# Patient Record
Sex: FEMALE | Race: WHITE | Employment: FULL TIME | URBAN - METROPOLITAN AREA
[De-identification: names, ages, dates, MRNs, and addresses within clinical notes are randomized per-mention and may not be internally consistent; named-entity substitution may affect disease eponyms.]

---

## 2017-10-02 ENCOUNTER — OFFICE VISIT (OUTPATIENT)
Dept: INTERNAL MEDICINE CLINIC | Age: 57
End: 2017-10-02

## 2017-10-02 VITALS
BODY MASS INDEX: 37.77 KG/M2 | WEIGHT: 235 LBS | HEART RATE: 71 BPM | DIASTOLIC BLOOD PRESSURE: 66 MMHG | SYSTOLIC BLOOD PRESSURE: 100 MMHG | HEIGHT: 66 IN | TEMPERATURE: 98.7 F

## 2017-10-02 DIAGNOSIS — J01.90 ACUTE BACTERIAL SINUSITIS: Primary | ICD-10-CM

## 2017-10-02 DIAGNOSIS — B96.89 ACUTE BACTERIAL SINUSITIS: Primary | ICD-10-CM

## 2017-10-02 PROBLEM — D50.9 IRON DEFICIENCY ANEMIA: Status: ACTIVE | Noted: 2017-10-02

## 2017-10-02 PROBLEM — R53.83 FATIGUE: Status: ACTIVE | Noted: 2017-10-02

## 2017-10-02 PROBLEM — M54.9 BACK PAIN: Status: ACTIVE | Noted: 2017-10-02

## 2017-10-02 PROBLEM — E11.9 DIABETES MELLITUS (HCC): Status: ACTIVE | Noted: 2017-10-02

## 2017-10-02 PROBLEM — M26.629 TMJ ARTHRALGIA: Status: ACTIVE | Noted: 2017-10-02

## 2017-10-02 PROBLEM — M19.90 ARTHRITIS: Status: ACTIVE | Noted: 2017-10-02

## 2017-10-02 PROBLEM — E03.9 HYPOTHYROIDISM: Status: ACTIVE | Noted: 2017-10-02

## 2017-10-02 PROBLEM — F41.9 ANXIETY: Status: ACTIVE | Noted: 2017-10-02

## 2017-10-02 PROBLEM — R07.89 ATYPICAL CHEST PAIN: Status: ACTIVE | Noted: 2017-10-02

## 2017-10-02 PROBLEM — G56.21 ULNAR NEUROPATHY AT ELBOW, RIGHT: Status: ACTIVE | Noted: 2017-10-02

## 2017-10-02 PROBLEM — K58.1 IRRITABLE BOWEL SYNDROME WITH CONSTIPATION: Status: ACTIVE | Noted: 2017-10-02

## 2017-10-02 RX ORDER — FLUCONAZOLE 150 MG/1
150 TABLET ORAL DAILY
Qty: 2 TAB | Refills: 0 | Status: SHIPPED | OUTPATIENT
Start: 2017-10-02 | End: 2017-10-04

## 2017-10-02 RX ORDER — CEFDINIR 300 MG/1
300 CAPSULE ORAL 2 TIMES DAILY
Qty: 20 CAP | Refills: 0 | Status: SHIPPED | OUTPATIENT
Start: 2017-10-02 | End: 2017-10-13 | Stop reason: SDUPTHER

## 2017-10-02 RX ORDER — FLUCONAZOLE 150 MG/1
150 TABLET ORAL DAILY
COMMUNITY
End: 2017-10-02 | Stop reason: ALTCHOICE

## 2017-10-02 RX ORDER — BUSPIRONE HYDROCHLORIDE 5 MG/1
TABLET ORAL
COMMUNITY
End: 2017-10-02

## 2017-10-02 RX ORDER — INSULIN LISPRO 100 [IU]/ML
INJECTION, SOLUTION INTRAVENOUS; SUBCUTANEOUS
COMMUNITY
End: 2021-05-03

## 2017-10-02 RX ORDER — LORAZEPAM 1 MG/1
TABLET ORAL
COMMUNITY

## 2017-10-02 RX ORDER — HYDROCODONE POLISTIREX AND CHLORPHENIRAMINE POLISTIREX 10; 8 MG/5ML; MG/5ML
SUSPENSION, EXTENDED RELEASE ORAL
COMMUNITY
End: 2017-10-02

## 2017-10-02 NOTE — MR AVS SNAPSHOT
Visit Information Date & Time Provider Department Dept. Phone Encounter #  
 10/2/2017 11:10 AM NINI Zafar MD [facility] 84 [phone] [id number] Follow-up Instructions Return if symptoms worsen or fail to improve. Follow-up and Disposition History Upcoming Health Maintenance Date Due Hepatitis C Screening 1960 DTaP/Tdap/Td series (1 - Tdap) 3/19/1983 PAP AKA CERVICAL CYTOLOGY 3/19/1983 BREAST CANCER SCRN MAMMOGRAM 3/19/2012 FOBT Q 1 YEAR AGE 50-75 3/19/2012 INFLUENZA AGE 9 TO ADULT 8/1/2017 Allergies as of 10/2/2017  Review Complete On: 10/2/2017 By: Heena Parnell MD  
  
 Severity Noted Reaction Type Reactions Adhesive  10/10/2013    Rash Augmentin [Amoxicillin-pot Clavulanate]  10/02/2017    Hives Codeine  09/03/2013    Nausea and Vomiting Crestor [Rosuvastatin]  10/02/2017    Hives Levaquin [Levofloxacin]  10/02/2017    Unknown (comments) Morphine  09/03/2013    Hives Oxycodone  12/01/2013    Itching Penicillins  09/03/2013    Hives Sulfa (Sulfonamide Antibiotics)  09/03/2013    Hives Current Immunizations  Reviewed on 12/7/2013 Name Date Influenza Vaccine 10/7/2014 Not reviewed this visit You Were Diagnosed With   
  
 Codes Comments Acute bacterial sinusitis    -  Primary ICD-10-CM: J01.90, B96.89 
ICD-9-CM: 461.9 Vitals BP Pulse Temp Height(growth percentile) Weight(growth percentile) LMP  
 100/66 (BP 1 Location: Left arm, BP Patient Position: Sitting) 71 98.7 °F (37.1 °C) (Oral) 5' 6\" (1.676 m) 235 lb (106.6 kg) 10/10/2010 BMI OB Status Smoking Status 37.93 kg/m2 Postmenopausal Former Smoker Vitals History BMI and BSA Data Body Mass Index Body Surface Area  
 37.93 kg/m 2 2.23 m 2 Preferred Pharmacy Pharmacy Name Phone  [facility] [address] , [address] [place] [address] Alexey Dandy Lito Schreiber [phone] Your Updated Medication List  
  
   
This list is accurate as of: 10/2/17 12:29 PM.  Always use your most recent med list.  
  
  
  
  
 cefdinir 300 mg capsule Commonly known as:  OMNICEF Take 1 Cap by mouth two (2) times a day for 10 days. fluconazole 150 mg tablet Commonly known as:  DIFLUCAN Take 1 Tab by mouth daily for 2 days. FDA advises cautious prescribing of oral fluconazole in pregnancy. HumaLOG 100 unit/mL injection Generic drug:  insulin lispro  
by SubCUTAneous route. 15 - 20 Units before breakfast, lunch and dinner  
  
 levothyroxine 175 mcg tablet Commonly known as:  SYNTHROID Take 175 mcg by mouth Daily (before breakfast). lisinopril 10 mg tablet Commonly known as:  Nadya Dmitriy Take 10 mg by mouth every evening. LORazepam 1 mg tablet Commonly known as:  ATIVAN Take  by mouth every four (4) hours as needed for Anxiety. TRESIBA FLEXTOUCH U-200 SC  
116 Units by SubCUTAneous route daily. TRULICITY 1.5 ZQ/7.9 mL sub-q pen Generic drug:  dulaglutide 1.5 mg by SubCUTAneous route every seven (7) days. Prescriptions Sent to Pharmacy Refills  
 cefdinir (OMNICEF) 300 mg capsule 0 Sig: Take 1 Cap by mouth two (2) times a day for 10 days. Class: Normal  
 Pharmacy: [address] Ph #: [phone] Route: Oral  
 fluconazole (DIFLUCAN) 150 mg tablet 0 Sig: Take 1 Tab by mouth daily for 2 days. FDA advises cautious prescribing of oral fluconazole in pregnancy. Class: Normal  
 Pharmacy: [address], [address] [place] [address] Ph #: [phone] Route: Oral  
  
Follow-up Instructions Return if symptoms worsen or fail to improve. Introducing [facility] & HEALTH SERVICES!    
 [facility] introduces Consumr patient portal. Now you can access parts of your medical record, email your doctor's office, and request medication refills online. 1. In your internet browser, go to [email or web address] 2. Click on the First Time User? Click Here link in the Sign In box. You will see the New Member Sign Up page. 3. Enter your Exitroundt Access Code exactly as it appears below. You will not need to use this code after youve completed the sign-up process. If you do not sign up before the expiration date, you must request a new code. · Exitroundt Access Code: [address] [facility] [address] Expires: 12/31/2017 11:02 AM 
 
4. Enter the last four digits of your Social Security Number (xxxx) and Date of Birth (mm/dd/yyyy) as indicated and click Submit. You will be taken to the next sign-up page. 5. Create a Exitroundt ID. This will be your Spacebar login ID and cannot be changed, so think of one that is secure and easy to remember. 6. Create a Spacebar password. You can change your password at any time. 7. Enter your Password Reset Question and Answer. This can be used at a later time if you forget your password. 8. Enter your e-mail address. You will receive e-mail notification when new information is available in [address] 9. Click Sign Up. You can now view and download portions of your medical record. 10. Click the Download Summary menu link to download a portable copy of your medical information. If you have questions, please visit the Frequently Asked Questions section of the Spacebar website. Remember, Spacebar is NOT to be used for urgent needs. For medical emergencies, dial 911. Now available from your iPhone and Android! Please provide this summary of care documentation to your next provider. Your primary care clinician is listed as NINI Bobby. If you have any questions after today's visit, please call [phone].

## 2017-10-02 NOTE — PROGRESS NOTES
Cali Dumont is a 56 y.o. female presenting for Cold Symptoms (Rm 1 - head congestion X 1 week - ear pain - green drainage)  . 1. Have you been to the ER, urgent care clinic since your last visit? Hospitalized since your last visit? No    2. Have you seen or consulted any other health care providers outside of the [address] [place] [address] since your last visit? Include any pap smears or colon screening.  Yes When: 2 weeks ago Where: Dr Lauren Arenas Reason for visit: endo   [facility] - 8 weeks for sinus infection

## 2017-10-02 NOTE — PROGRESS NOTES
This note will not be viewable in [address] Isla Osgood is a 56 y.o. female and presents with Cold Symptoms (Rm 1 - head congestion X 1 week - ear pain - green drainage - headache - had sinus inf 8 weeks ago at [facility] got Doxycycline did not help / )  . Subjective:  Mrs. Lynn Rajan presents today with complaint of sinus pressure congestion and drainage of yellowish-green color. Symptoms of actually been present for several weeks. [facility] went to [facility] several weeks ago and was placed on a course of doxycycline. She states she felt better for a few days but then this returned. She continues to have sinus pressure congestion and drainage. She has had a slight cough but this is nonproductive. She denies any fever chills or rigors.     Past Medical History:   Diagnosis Date    Anxiety 10/2/2017    Arthritis 10/2/2017    Atypical chest pain 10/2/2017    Back pain 10/2/2017    Diabetes ([facility] [address]     IDDM    Diabetes mellitus ([facility] [address] 10/2/2017    Environmental allergies     COLOGNES, CLEANING PRODUCTS, PETROLEUM - BREATHING PROBLEMS    Fatigue 10/2/2017    GERD (gastroesophageal reflux disease)     Hypothyroidism 10/2/2017    Iron deficiency anemia 10/2/2017    Irritable bowel syndrome with constipation 10/2/2017    Migraine     LAST HEADACHE 10-3-13    Nausea & vomiting     Non-allergic rhinitis     Thyroid disease     HYPOTHYROID    TMJ arthralgia 10/2/2017    Ulnar neuropathy at elbow, right 10/2/2017     Past Surgical History:   Procedure Laterality Date    HX APPENDECTOMY  CHILD    HX  SECTION      HX GYN      TUBAL PREGNANCY    HX GYN      EXC BARTHOLIN CYST    HX HEENT      SINUS SURGERY    HX ORTHOPAEDIC      EXC CYST RIGHT WRIST    HX ORTHOPAEDIC      REPAIR FX RIGHT LOWER LEG - TESSA    HX TONSILLECTOMY  CHILD    HX TUBAL LIGATION       Allergies   Allergen Reactions    Adhesive Rash    Augmentin [Amoxicillin-Pot Clavulanate] Hives    Codeine Nausea and Vomiting    Crestor [Rosuvastatin] Hives    Levaquin [Levofloxacin] Unknown (comments)    Morphine Hives    Oxycodone Itching    Penicillins Hives    Sulfa (Sulfonamide Antibiotics) Hives     Current Outpatient Prescriptions   Medication Sig Dispense Refill    insulin lispro (HUMALOG) 100 unit/mL injection by SubCUTAneous route. 15 - 20 Units before breakfast, lunch and dinner      INSULIN DEGLUDEC (TRESIBA FLEXTOUCH U-200 SC) 116 Units by SubCUTAneous route daily.  LORazepam (ATIVAN) 1 mg tablet Take  by mouth every four (4) hours as needed for Anxiety.  dulaglutide (TRULICITY) 1.5 NJ/6.8 mL sub-q pen 1.5 mg by SubCUTAneous route every seven (7) days.  cefdinir (OMNICEF) 300 mg capsule Take 1 Cap by mouth two (2) times a day for 10 days. 20 Cap 0    fluconazole (DIFLUCAN) 150 mg tablet Take 1 Tab by mouth daily for 2 days. FDA advises cautious prescribing of oral fluconazole in pregnancy. 2 Tab 0    levothyroxine (SYNTHROID) 175 mcg tablet Take 175 mcg by mouth Daily (before breakfast).  lisinopril (PRINIVIL, ZESTRIL) 10 mg tablet Take 10 mg by mouth every evening. Social History     Social History    Marital status:      Spouse name: N/A    Number of children: N/A    Years of education: N/A     Social History Main Topics    Smoking status: Former Smoker     Quit date: 10/10/1990    Smokeless tobacco: Never Used    Alcohol use Yes      Comment: 4 DRINKS PER MONTH    Drug use: No    Sexual activity: Not Asked     Other Topics Concern    None     Social History Narrative     Family History   Problem Relation Age of Onset    Post-op Nausea/Vomiting Mother        Review of Systems  Constitutional: negative for fevers, chills, anorexia and weight loss  Eyes:   negative for visual disturbance and irritation  ENT:   Positive for some sinus congestion and post nasal drainage.    Respiratory:  Positive for cough and chest congestion without wheezing  CV:   negative for chest pain, palpitations, lower extremity edema  GI:   negative for nausea, vomiting, diarrhea, abdominal pain,melena  Integumentary: negative for rash and pruritus  Neurological:  negative for headaches, dizziness, vertigo, memory problems and gait       Objective:  Visit Vitals    /66 (BP 1 Location: Left arm, BP Patient Position: Sitting)    Pulse 71    Temp 98.7 °F (37.1 °C) (Oral)    Ht 5' 6\" (1.676 m)    Wt 235 lb (106.6 kg)    LMP 10/10/2010    BMI 37.93 kg/m2     Physical Exam:   General appearance - alert, ill appearing, and in no distress  Mental status - alert, oriented to person, place, and time  EYE-RADHA, EOMI, conjuctiva clear. No lid swelling or purulent drainage  ENT- TM's clear without A/F level. Pharynx slightly erythematous with drainage noted  Nose -nares are erythematous and boggy  Neck - supple, no significant adenopathy   Chest - Coarse upper airway rhonchi present without wheezing   Heart - normal rate, regular rhythm, normal S1, S2, no murmurs, rubs, clicks or gallops   Skin-No rash appreciated  Neuro -alert, oriented, normal speech, no focal findings. Assessment/Plan:  Diagnoses and all orders for this visit:    1. Acute bacterial sinusitis    Other orders  -     cefdinir (OMNICEF) 300 mg capsule; Take 1 Cap by mouth two (2) times a day for 10 days. -     fluconazole (DIFLUCAN) 150 mg tablet; Take 1 Tab by mouth daily for 2 days. FDA advises cautious prescribing of oral fluconazole in pregnancy. The patient has tolerated Omnicef in the past.  She will be given a 10 day course. She is also given Diflucan for history of vaginal yeast infection secondary to antibiotics. She will follow-up if her symptoms do not improve. Other Instructions:  Mucinex as directed    Increase po fluids    Follow-up Disposition:  Return if symptoms worsen or fail to improve. I have reviewed with the patient details of the assessment and plan and all questions were answered.  Relevent patient education was performed. An After Visit Summary was printed and given to the patient.     Olga Jha MD

## 2017-10-11 ENCOUNTER — TELEPHONE (OUTPATIENT)
Dept: INTERNAL MEDICINE CLINIC | Age: 57
End: 2017-10-11

## 2017-10-13 RX ORDER — CEFDINIR 300 MG/1
300 CAPSULE ORAL 2 TIMES DAILY
Qty: 20 CAP | Refills: 0 | Status: SHIPPED | OUTPATIENT
Start: 2017-10-13 | End: 2019-05-23 | Stop reason: SDUPTHER

## 2018-03-07 ENCOUNTER — OFFICE VISIT (OUTPATIENT)
Dept: INTERNAL MEDICINE CLINIC | Age: 58
End: 2018-03-07

## 2018-03-07 VITALS
HEIGHT: 65 IN | BODY MASS INDEX: 39.49 KG/M2 | SYSTOLIC BLOOD PRESSURE: 115 MMHG | WEIGHT: 237 LBS | HEART RATE: 83 BPM | OXYGEN SATURATION: 97 % | DIASTOLIC BLOOD PRESSURE: 82 MMHG

## 2018-03-07 DIAGNOSIS — R79.89 LOW VITAMIN D LEVEL: ICD-10-CM

## 2018-03-07 DIAGNOSIS — E03.9 ACQUIRED HYPOTHYROIDISM: ICD-10-CM

## 2018-03-07 DIAGNOSIS — R53.83 FATIGUE, UNSPECIFIED TYPE: ICD-10-CM

## 2018-03-07 DIAGNOSIS — D50.9 IRON DEFICIENCY ANEMIA, UNSPECIFIED IRON DEFICIENCY ANEMIA TYPE: ICD-10-CM

## 2018-03-07 DIAGNOSIS — E11.9 TYPE 2 DIABETES MELLITUS WITHOUT COMPLICATION, WITHOUT LONG-TERM CURRENT USE OF INSULIN (HCC): Primary | ICD-10-CM

## 2018-03-07 DIAGNOSIS — F41.9 ANXIETY: ICD-10-CM

## 2018-03-07 LAB
ALBUMIN SERPL-MCNC: 4.7 G/DL (ref 3.9–5.4)
ALKALINE PHOS POC: 72 U/L (ref 38–126)
ALT SERPL-CCNC: 66 U/L (ref 9–52)
AST SERPL-CCNC: 57 U/L (ref 14–36)
BUN BLD-MCNC: 17 MG/DL (ref 7–17)
CALCIUM BLD-MCNC: 10.2 MG/DL (ref 8.4–10.2)
CHLORIDE BLD-SCNC: 102 MMOL/L (ref 98–107)
CO2 POC: 29 MMOL/L (ref 22–32)
CREAT BLD-MCNC: 0.5 MG/DL (ref 0.7–1.2)
EGFR (POC): 109
GLUCOSE POC: 223 MG/DL (ref 65–105)
GRAN# POC: 4.7 K/UL (ref 2–7.8)
GRAN% POC: 59 % (ref 37–92)
HBA1C MFR BLD HPLC: 8.4 % (ref 4.5–5.7)
HCT VFR BLD CALC: 43.8 % (ref 37–51)
HGB BLD-MCNC: 15.2 G/DL (ref 12–18)
IRON POC: 97 UG/DL (ref 37–170)
IRON SATURATION POC: 18 % (ref 15–55)
LY# POC: 2.7 K/UL (ref 0.6–4.1)
LY% POC: 36.1 % (ref 10–58.5)
MCH RBC QN: 32.8 PG (ref 26–32)
MCHC RBC-ENTMCNC: 34.7 G/DL (ref 30–36)
MCV RBC: 94 FL (ref 80–97)
MID #, POC: 0.3 K/UL (ref 0–1.8)
MID% POC: 4.9 % (ref 0.1–24)
PLATELET # BLD: 288 K/UL (ref 140–440)
POTASSIUM SERPL-SCNC: 4.8 MMOL/L (ref 3.6–5)
PROT SERPL-MCNC: 7.5 G/DL (ref 6.3–8.2)
RBC # BLD: 4.64 M/UL (ref 4.2–6.3)
SODIUM SERPL-SCNC: 140 MMOL/L (ref 137–145)
T4 FREE SERPL-MCNC: 1.49 NG/DL (ref 0.71–1.85)
TIBC POC: 535 UG/DL (ref 265–497)
TOTAL BILIRUBIN POC: 0.6 MG/DL (ref 0.2–1.3)
TSH BLD-ACNC: 0.52 UIU/ML (ref 0.4–4.2)
VITAMIN D POC: 51.4 NG/ML (ref 30–96)
WBC # BLD: 7.7 K/UL (ref 4.1–10.9)

## 2018-03-07 RX ORDER — CARISOPRODOL 250 MG/1
250 TABLET ORAL 4 TIMES DAILY
COMMUNITY
End: 2018-12-19

## 2018-03-07 RX ORDER — DICLOFENAC SODIUM 75 MG/1
TABLET, DELAYED RELEASE ORAL
COMMUNITY
End: 2018-12-19

## 2018-03-07 NOTE — MR AVS SNAPSHOT
[address] [address] [phone] Patient: Solo Park MRN: [id number] TPY:[id number] Visit Information Date & Time Provider Department Dept. Phone Encounter #  
 3/7/2018  9:50 AM NINI Traore MD [facility] [phone] [id number] Follow-up Instructions Return for as scheduled. Upcoming Health Maintenance Date Due Hepatitis C Screening 1960 LIPID PANEL Q1 1962 FOOT EXAM Q1 3/19/1972 MICROALBUMIN Q1 3/19/1972 EYE EXAM RETINAL OR DILATED Q1 3/19/1972 Pneumococcal 19-64 Medium Risk (1 of 1 - PPSV23) 3/19/1981 DTaP/Tdap/Td series (1 - Tdap) 3/19/1983 PAP AKA CERVICAL CYTOLOGY 3/19/1983 BREAST CANCER SCRN MAMMOGRAM 3/19/2012 FOBT Q 1 YEAR AGE 50-75 3/19/2012 HEMOGLOBIN A1C Q6M 6/3/2014 Influenza Age 5 to Adult 8/1/2017 Allergies as of 3/7/2018  Review Complete On: 3/7/2018 By: Aminata Franklin MD  
  
 Severity Noted Reaction Type Reactions Adhesive  10/10/2013    Rash Augmentin [Amoxicillin-pot Clavulanate]  10/02/2017    Hives Codeine  09/03/2013    Nausea and Vomiting Crestor [Rosuvastatin]  10/02/2017    Hives Levaquin [Levofloxacin]  10/02/2017    Unknown (comments) Morphine  09/03/2013    Hives Oxycodone  12/01/2013    Itching Penicillins  09/03/2013    Hives Sulfa (Sulfonamide Antibiotics)  09/03/2013    Hives Current Immunizations  Reviewed on 12/7/2013 Name Date Influenza Vaccine 10/7/2014 Not reviewed this visit You Were Diagnosed With   
  
 Codes Comments Type 2 diabetes mellitus without complication, without long-term current use of insulin (HCC)    -  Primary ICD-10-CM: E11.9 ICD-9-CM: 250.00 Acquired hypothyroidism     ICD-10-CM: E03.9 ICD-9-CM: 244.9 Anxiety     ICD-10-CM: F41.9 ICD-9-CM: 300.00  Fatigue, unspecified type     ICD-10-CM: R53.83 
 ICD-9-CM: 780.79 Iron deficiency anemia, unspecified iron deficiency anemia type     ICD-10-CM: D50.9 ICD-9-CM: 280.9 Low vitamin D level     ICD-10-CM: E55.9 ICD-9-CM: 268.9 Vitals BP Pulse Height(growth percentile) Weight(growth percentile) LMP SpO2  
 115/82 (BP 1 Location: Left arm, BP Patient Position: Sitting) 83 5' 5\" (1.651 m) 237 lb (107.5 kg) 10/10/2010 97% BMI OB Status Smoking Status 39.44 kg/m2 Postmenopausal Former Smoker Vitals History BMI and BSA Data Body Mass Index Body Surface Area  
 39.44 kg/m 2 2.22 m 2 Preferred Pharmacy Pharmacy Name Phone [facility] [address] [phone] Your Updated Medication List  
  
   
This list is accurate as of 3/7/18 11:06 AM.  Always use your most recent med list.  
  
  
  
  
 carisoprodol 250 mg tablet Commonly known as:  SOMA Take 250 mg by mouth four (4) times daily. diclofenac EC 75 mg EC tablet Commonly known as:  VOLTAREN Take  by mouth. HumaLOG U-100 Insulin 100 unit/mL injection Generic drug:  insulin lispro  
by SubCUTAneous route. 15 - 20 Units before breakfast, lunch and dinner  
  
 levothyroxine 175 mcg tablet Commonly known as:  SYNTHROID Take 175 mcg by mouth Daily (before breakfast). lisinopril 10 mg tablet Commonly known as:  Madonna Cliche Take 10 mg by mouth every evening. LORazepam 1 mg tablet Commonly known as:  ATIVAN Take  by mouth every four (4) hours as needed for Anxiety. TRESIBA FLEXTOUCH U-200 SC  
116 Units by SubCUTAneous route daily. TRULICITY 1.5 GY/2.7 mL sub-q pen Generic drug:  dulaglutide 1.5 mg by SubCUTAneous route every seven (7) days. We Performed the Following AMB POC COMPLETE CBC,AUTOMATED ENTER E5460342 CPT(R)] AMB POC COMPREHENSIVE METABOLIC PANEL [47526 CPT(R)] AMB POC HEMOGLOBIN A1C [65125 CPT(R)] AMB POC IRON BINDING CAPACITY (FE/TIBC) [[id number] Custom] AMB POC T4, FREE [id number] CPT(R)] AMB POC TSH [34831 CPT(R)] AMB POC VITAMIN D [33876 CPT(R)] Follow-up Instructions Return for as scheduled. Introducing hospitals & HEALTH SERVICES! Niru Whipple introduces [employer]an Fight patient portal. Now you can access parts of your medical record, email your doctor's office, and request medication refills online. 1. In your internet browser, go to [email or web address] 2. Click on the First Time User? Click Here link in the Sign In box. You will see the New Member Sign Up page. 3. Enter your [employer] Fight Access Code exactly as it appears below. You will not need to use this code after youve completed the sign-up process. If you do not sign up before the expiration date, you must request a new code. · [employer] Fight Access Code: [email or web address] Expires: 6/5/2018  9:50 AM 
 
4. Enter the last four digits of your Social Security Number (xxxx) and Date of Birth (mm/dd/yyyy) as indicated and click Submit. You will be taken to the next sign-up page. 5. Create a Clan Fight ID. This will be your Clan Fight login ID and cannot be changed, so think of one that is secure and easy to remember. 6. Create a Clan Fight password. You can change your password at any time. 7. Enter your Password Reset Question and Answer. This can be used at a later time if you forget your password. 8. Enter your e-mail address. You will receive e-mail notification when new information is available in [address] 9. Click Sign Up. You can now view and download portions of your medical record. 10. Click the Download Summary menu link to download a portable copy of your medical information. If you have questions, please visit the Frequently Asked Questions section of the Clan Fight website. Remember, [employer] Fight is NOT to be used for urgent needs. For medical emergencies, dial 911. Now available from your iPhone and Android! Please provide this summary of care documentation to your next provider. Your primary care clinician is listed as NINI John. If you have any questions after today's visit, please call [phone].

## 2018-03-07 NOTE — PROGRESS NOTES
Keisha [place] presents with   Chief Complaint   Patient presents with    Abdominal Pain    Headache    Hair/Scalp Problem     hair loss    Nail Problem     Fingernail & toenail dry    Eye Problem     bilateral dryness     Patient here with complaint of upper abdominal pain, headache, hair loss, fingernail & toenail brittle & eyes dry. 1. Have you been to the ER, urgent care clinic since your last visit? Hospitalized since your last visit? Yes When: 12/2017 Where: [facility] Reason for visit: Flu--positive for A & B    2. Have you seen or consulted any other health care providers outside of the [address] [place] [address] since your last visit? Include any pap smears or colon screening.  Yes When: 01/2017 Where: [facility]Dr Miguelito Fontana Reason for visit: back pain

## 2018-03-07 NOTE — PROGRESS NOTES
This note will not be viewable in [address] Maria L Riley is a 56 y.o. female and presents with Abdominal Pain; Headache; Hair/Scalp Problem (hair loss); Nail Problem (Fingernail & toenail dry); and Eye Problem (bilateral dryness)  . Subjective:  Mrs. Sushma Wright presents today with multiple complaints. Primarily she is concerned because she has had significant hair loss over the past couple of months. She notes brittleness of her nails. She has been taking an over-the-counter supplement with biotin which does not seem to have helped. She saw her endocrinologist and had her thyroid testing done which was in normal range. She has had recurring headaches which appear to be triggered by the change in the weather. She has been under a fair amount of stress over the past few months and after initially losing a significant amount of weight has regained that weight and is very frustrated by this. She also has abdominal distention and mild discomfort in the left upper quadrant is been present for the past few weeks. It comes and goes. It is not associated with eating. There are no changes in her bowel habits. Pain seems to be exacerbated when she sits up from a lying position.     Past Medical History:   Diagnosis Date    Anxiety 10/2/2017    Arthritis 10/2/2017    Atypical chest pain 10/2/2017    Back pain 10/2/2017    Diabetes ([address]     IDDM    Diabetes mellitus ([address] 10/2/2017    Environmental allergies     COLOGNES, CLEANING PRODUCTS, PETROLEUM - BREATHING PROBLEMS    Fatigue 10/2/2017    GERD (gastroesophageal reflux disease)     Hypothyroidism 10/2/2017    Iron deficiency anemia 10/2/2017    Irritable bowel syndrome with constipation 10/2/2017    Migraine     LAST HEADACHE 10-3-13    Nausea & vomiting     Non-allergic rhinitis     Thyroid disease     HYPOTHYROID    TMJ arthralgia 10/2/2017    Ulnar neuropathy at elbow, right 10/2/2017     Past Surgical History:   Procedure Laterality Date  HX APPENDECTOMY  CHILD    HX  SECTION      HX GYN      TUBAL PREGNANCY    HX GYN      EXC BARTHOLIN CYST    HX HEENT      SINUS SURGERY    HX ORTHOPAEDIC      EXC CYST RIGHT WRIST    HX ORTHOPAEDIC      REPAIR FX RIGHT LOWER LEG - TESSA    HX TONSILLECTOMY  CHILD    HX TUBAL LIGATION       Allergies   Allergen Reactions    Adhesive Rash    Augmentin [Amoxicillin-Pot Clavulanate] Hives    Codeine Nausea and Vomiting    Crestor [Rosuvastatin] Hives    Levaquin [Levofloxacin] Unknown (comments)    Morphine Hives    Oxycodone Itching    Penicillins Hives    Sulfa (Sulfonamide Antibiotics) Hives     Current Outpatient Prescriptions   Medication Sig Dispense Refill    diclofenac EC (VOLTAREN) 75 mg EC tablet Take  by mouth.  carisoprodol (SOMA) 250 mg tablet Take 250 mg by mouth four (4) times daily.  insulin lispro (HUMALOG) 100 unit/mL injection by SubCUTAneous route. 15 - 20 Units before breakfast, lunch and dinner      INSULIN DEGLUDEC (TRESIBA FLEXTOUCH U-200 SC) 116 Units by SubCUTAneous route daily.  dulaglutide (TRULICITY) 1.5 BB/7.3 mL sub-q pen 1.5 mg by SubCUTAneous route every seven (7) days.  levothyroxine (SYNTHROID) 175 mcg tablet Take 175 mcg by mouth Daily (before breakfast).  lisinopril (PRINIVIL, ZESTRIL) 10 mg tablet Take 10 mg by mouth every evening.  LORazepam (ATIVAN) 1 mg tablet Take  by mouth every four (4) hours as needed for Anxiety.        Social History     Social History    Marital status:      Spouse name: N/A    Number of children: N/A    Years of education: N/A     Social History Main Topics    Smoking status: Former Smoker     Quit date: 10/10/1990    Smokeless tobacco: Never Used    Alcohol use Yes      Comment: 4 DRINKS PER MONTH    Drug use: No    Sexual activity: Not Asked     Other Topics Concern    None     Social History Narrative     Family History   Problem Relation Age of Onset    Post-op Nausea/Vomiting Mother        Review of Systems  Constitutional:  negative for fevers, chills, anorexia and weight loss  Eyes:    negative for visual disturbance and irritation  ENT:    negative for tinnitus,sore throat,nasal congestion,ear pains. hoarseness  Respiratory:     negative for cough, hemoptysis, dyspnea,wheezing  CV:    negative for chest pain, palpitations, lower extremity edema  GI:    negative for nausea, vomiting, diarrhea, abdominal pain,melena  Endo:               negative for polyuria,polydipsia,polyphagia,heat intolerance  Genitourinary : negative for frequency, dysuria and hematuria  Integumentary: negative for rash and pruritus  Hematologic:   negative for easy bruising and gum/nose bleeding  Musculoskel:  negative for myalgias, arthralgias, back pain, muscle weakness, joint pain  Neurological:   negative for headaches, dizziness, vertigo, memory problems and gait   Behavl/Psych:  negative for feelings of depression, mood changes  ROS otherwise negative      Objective:  Visit Vitals    /82 (BP 1 Location: Left arm, BP Patient Position: Sitting)    Pulse 83    Ht 5' 5\" (1.651 m)    Wt 237 lb (107.5 kg)    LMP 10/10/2010    SpO2 97%    BMI 39.44 kg/m2     Physical Exam:   General appearance - alert, well appearing, and in no distress  Mental status - alert, oriented to person, place, and time  EYE-RADHA, EOMI, fundi normal, corneas normal, no foreign bodies  ENT-ENT exam normal, no neck nodes or sinus tenderness  Nose - normal and patent, no erythema, discharge or polyps  Mouth - mucous membranes moist, pharynx normal without lesions  Neck - supple, no significant adenopathy   Chest - clear to auscultation, no wheezes, rales or rhonchi, symmetric air entry   Heart - normal rate, regular rhythm, normal S1, S2, no murmurs, rubs, clicks or gallops   Abdomen - soft, minimally tender left upper abdominal wall with no palpable hernia, nondistended, no masses or organomegaly  Lymph- no adenopathy palpable  Ext-peripheral pulses normal, no pedal edema, no clubbing or cyanosis  Skin-Warm and dry. no hyperpigmentation, vitiligo, or suspicious lesions  Neuro -alert, oriented, normal speech, no focal findings or movement disorder noted      Assessment/Plan:  Diagnoses and all orders for this visit:    1. Type 2 diabetes mellitus without complication, without long-term current use of insulin (HCC)  -     AMB POC HEMOGLOBIN A1C  -     AMB POC COMPREHENSIVE METABOLIC PANEL    2. Acquired hypothyroidism  -     AMB POC T4, FREE  -     AMB POC TSH    3. Anxiety    4. Fatigue, unspecified type  -     AMB POC COMPLETE CBC,AUTOMATED ENTER  -     AMB POC COMPREHENSIVE METABOLIC PANEL    5. Iron deficiency anemia, unspecified iron deficiency anemia type  -     AMB POC COMPLETE CBC,AUTOMATED ENTER  -     AMB POC IRON BINDING CAPACITY (FE/TIBC)    6. Low vitamin D level  -     AMB POC VITAMIN D          ICD-10-CM ICD-9-CM    1. Type 2 diabetes mellitus without complication, without long-term current use of insulin (HCC) E11.9 250.00 AMB POC HEMOGLOBIN A1C      AMB POC COMPREHENSIVE METABOLIC PANEL   2. Acquired hypothyroidism E03.9 244.9 AMB POC T4, FREE      AMB POC TSH   3. Anxiety F41.9 300.00    4. Fatigue, unspecified type R53.83 780.79 AMB POC COMPLETE CBC,AUTOMATED ENTER      AMB POC COMPREHENSIVE METABOLIC PANEL   5. Iron deficiency anemia, unspecified iron deficiency anemia type D50.9 280.9 AMB POC COMPLETE CBC,AUTOMATED ENTER      AMB POC IRON BINDING CAPACITY (FE/TIBC)   6. Low vitamin D level E55.9 268.9 AMB POC VITAMIN D     Plan:    Patient has a significant number of symptoms all of which could be related to anxiety. However we will do follow-up thyroid testing as well as metabolic panel iron and vitamin D as well to make sure there is no metabolic abnormality that is contributing to her symptoms.   We will continue her current medical regimen and consider addition of medicine for anxiety if her symptoms should persist.    Follow-up Disposition:  Return for as scheduled. I have reviewed with the patient details of the assessment and plan and all questions were answered. Relevent patient education was performed. Verbal and/or written instructions (see AVS) provided. The most recent lab findings were reviewed with the patient. Plan was discussed with patient who verbally expressed understanding. An After Visit Summary was printed and given to the patient.     Izabela Estrada MD

## 2018-09-18 ENCOUNTER — DOCUMENTATION ONLY (OUTPATIENT)
Dept: INTERNAL MEDICINE CLINIC | Age: 58
End: 2018-09-18

## 2018-09-18 NOTE — PROGRESS NOTES
PT CALLED REQUESTING RX FOR SEDATIVE PRIOR TO HER MRI TOMORROW MORNING. DR. Ileana Beckman IS THE ORDERING PROVIDER FOR THE TEST. OK PER DR. ELIZABETH TO CALL IN ATIVAN 1MG/0 REFILL, TAKE ONE TABLET 1 HOUR PRIOR TO PROCEDURE. INSTRUCTED PT SHE MUST HAVE A  TAKE HER TO APPT. PT EXPRESSES UNDERSTANDING.

## 2018-09-25 ENCOUNTER — HOSPITAL ENCOUNTER (OUTPATIENT)
Dept: MRI IMAGING | Age: 58
Discharge: HOME | End: 2018-09-25
Attending: ORTHOPAEDIC SURGERY
Payer: COMMERCIAL

## 2018-09-25 DIAGNOSIS — M54.50 CHRONIC LOW BACK PAIN: ICD-10-CM

## 2018-09-25 DIAGNOSIS — M51.36 DEGENERATION OF INTERVERTEBRAL DISC OF LUMBAR REGION: ICD-10-CM

## 2018-09-25 DIAGNOSIS — G89.29 CHRONIC LOW BACK PAIN: ICD-10-CM

## 2018-09-25 PROCEDURE — 72148 MRI LUMBAR SPINE W/O DYE: CPT

## 2018-12-17 ENCOUNTER — OFFICE VISIT (OUTPATIENT)
Dept: INTERNAL MEDICINE CLINIC | Age: 58
End: 2018-12-17

## 2018-12-17 VITALS
HEART RATE: 70 BPM | BODY MASS INDEX: 39.65 KG/M2 | WEIGHT: 238 LBS | HEIGHT: 65 IN | RESPIRATION RATE: 16 BRPM | DIASTOLIC BLOOD PRESSURE: 66 MMHG | SYSTOLIC BLOOD PRESSURE: 100 MMHG | OXYGEN SATURATION: 95 %

## 2018-12-17 DIAGNOSIS — E03.9 ACQUIRED HYPOTHYROIDISM: ICD-10-CM

## 2018-12-17 DIAGNOSIS — Z01.818 PREOP EXAMINATION: Primary | ICD-10-CM

## 2018-12-17 DIAGNOSIS — M54.41 RIGHT-SIDED LOW BACK PAIN WITH RIGHT-SIDED SCIATICA, UNSPECIFIED CHRONICITY: ICD-10-CM

## 2018-12-17 DIAGNOSIS — E11.9 TYPE 2 DIABETES MELLITUS WITHOUT COMPLICATION, WITHOUT LONG-TERM CURRENT USE OF INSULIN (HCC): ICD-10-CM

## 2018-12-17 PROBLEM — E66.01 SEVERE OBESITY (HCC): Status: ACTIVE | Noted: 2018-12-17

## 2018-12-17 LAB
BACTERIA UA POCT, BACTPOCT: NORMAL
BILIRUB UR QL STRIP: NEGATIVE
CASTS UA POCT: NEGATIVE
CLUE CELLS, CLUEPOCT: NEGATIVE
CRYSTALS UA POCT, CRYSPOCT: NEGATIVE
EPITHELIAL CELLS POCT: NORMAL
GLUCOSE UR-MCNC: NEGATIVE MG/DL
GRAN# POC: 4.4 K/UL (ref 2–7.8)
GRAN% POC: 59.2 % (ref 37–92)
HCT VFR BLD CALC: 40.3 % (ref 37–51)
HGB BLD-MCNC: 13.7 G/DL (ref 12–18)
KETONES P FAST UR STRIP-MCNC: NEGATIVE MG/DL
LY# POC: 2.5 K/UL (ref 0.6–4.1)
LY% POC: 36.1 % (ref 10–58.5)
MCH RBC QN: 32.3 PG (ref 26–32)
MCHC RBC-ENTMCNC: 34.1 G/DL (ref 30–36)
MCV RBC: 95 FL (ref 80–97)
MID #, POC: 0.3 K/UL (ref 0–1.8)
MID% POC: 4.7 % (ref 0.1–24)
MUCUS UA POCT, MUCPOCT: NORMAL
PH UR STRIP: 7 [PH] (ref 5–7)
PLATELET # BLD: 256 K/UL (ref 140–440)
PROT UR QL STRIP: NEGATIVE
RBC # BLD: 4.26 M/UL (ref 4.2–6.3)
RBC UA POCT, RBCPOCT: NORMAL
SP GR UR STRIP: 1.01 (ref 1.01–1.02)
TRICH UA POCT, TRICHPOC: NEGATIVE
UA UROBILINOGEN AMB POC: NORMAL (ref 0.2–1)
URINALYSIS CLARITY POC: CLEAR
URINALYSIS COLOR POC: NORMAL
URINE BLOOD POC: NORMAL
URINE CULT COMMENT, POCT: NORMAL
URINE LEUKOCYTES POC: NORMAL
URINE NITRITES POC: NEGATIVE
WBC # BLD: 7.2 K/UL (ref 4.1–10.9)
WBC UA POCT, WBCPOCT: NORMAL
YEAST UA POCT, YEASTPOC: NEGATIVE

## 2018-12-17 RX ORDER — AZITHROMYCIN 250 MG/1
TABLET, FILM COATED ORAL
Qty: 6 TAB | Refills: 0 | Status: SHIPPED | OUTPATIENT
Start: 2018-12-17 | End: 2018-12-19

## 2018-12-17 NOTE — PROGRESS NOTES
This note will not be viewable in [address] Adriana Loza is a 66 y.o. female and presents with Pre-op Exam (back surgery on 1/09/19)  . Subjective:    Mrs. Hafsa Levine presents today for preoperative evaluation prior to undergoing fusion of the lumbar spine by Dr. Nitish Siegel on January 9, 2019. She is been having low back pain this been persistent on and off for the past few years and more recently had done physical therapy but is having worsening symptoms involving her lower spine with associated right lower extremity radiculopathy. She currently denies chest pain, palpitations, PND, orthopnea, or pedal edema. Risk factors include diabetes. Review of Systems  Constitutional:   Eyes:   negative for visual disturbance and irritation  ENT:   negative for tinnitus,sore throat,nasal congestion,ear pains. hoarseness  Respiratory:  negative for cough, hemoptysis, dyspnea,wheezing  CV:   negative for chest pain, palpitations, lower extremity edema  GI:   negative for nausea, vomiting, diarrhea, abdominal pain,melena  Endo:               negative for polyuria,polydipsia,polyphagia,heat intolerance  Genitourinary: negative for frequency, dysuria and hematuria  Integumentary: negative for rash and pruritus  Hematologic:  negative for easy bruising and gum/nose bleeding  Musculoskel: negative for myalgias, arthralgias,muscle weakness, joint pain  Neurological:  negative for headaches, dizziness, vertigo, memory problems and gait   Behavl/Psych: negative for feelings of anxiety, depression, mood changes    Past Medical History:   Diagnosis Date    Anxiety 10/2/2017    Arthritis 10/2/2017    Atypical chest pain 10/2/2017    Back pain 10/2/2017    Diabetes [address] [address] [address]     IDDM    Diabetes mellitus ([facility] [address] 10/2/2017    Environmental allergies     COLOGNES, CLEANING PRODUCTS, PETROLEUM - BREATHING PROBLEMS    Fatigue 10/2/2017    GERD (gastroesophageal reflux disease)     Hypothyroidism 10/2/2017    Iron deficiency anemia 10/2/2017    Irritable bowel syndrome with constipation 10/2/2017    Migraine     LAST HEADACHE 10-3-13    Nausea & vomiting     Non-allergic rhinitis     Thyroid disease     HYPOTHYROID    TMJ arthralgia 10/2/2017    Ulnar neuropathy at elbow, right 10/2/2017     Past Surgical History:   Procedure Laterality Date    HX APPENDECTOMY  CHILD    HX  SECTION      HX GYN      TUBAL PREGNANCY    HX GYN      EXC BARTHOLIN CYST    HX HEENT      SINUS SURGERY    HX ORTHOPAEDIC      EXC CYST RIGHT WRIST    HX ORTHOPAEDIC      REPAIR FX RIGHT LOWER LEG - TESSA    HX TONSILLECTOMY  CHILD    HX TUBAL LIGATION       Social History     Socioeconomic History    Marital status:      Spouse name: Not on file    Number of children: Not on file    Years of education: Not on file    Highest education level: Not on file   Tobacco Use    Smoking status: Former Smoker     Last attempt to quit: 10/10/1990     Years since quittin.2    Smokeless tobacco: Never Used   Substance and Sexual Activity    Alcohol use: Yes     Comment: 4 DRINKS PER MONTH    Drug use: No     Family History   Problem Relation Age of Onset    Post-op Nausea/Vomiting Mother      Current Outpatient Medications   Medication Sig Dispense Refill    insulin lispro (HUMALOG) 100 unit/mL injection by SubCUTAneous route. 15 - 20 Units before breakfast, lunch and dinner      INSULIN DEGLUDEC (TRESIBA FLEXTOUCH U-200 SC) 116 Units by SubCUTAneous route daily.  LORazepam (ATIVAN) 1 mg tablet Take  by mouth every four (4) hours as needed for Anxiety.  dulaglutide (TRULICITY) 1.5 PW/4.4 mL sub-q pen 1.5 mg by SubCUTAneous route every seven (7) days.  levothyroxine (SYNTHROID) 175 mcg tablet Take 175 mcg by mouth Daily (before breakfast).  lisinopril (PRINIVIL, ZESTRIL) 10 mg tablet Take 10 mg by mouth every evening.  diclofenac EC (VOLTAREN) 75 mg EC tablet Take  by mouth.       carisoprodol (SOMA) 250 mg tablet Take 250 mg by mouth four (4) times daily. Allergies   Allergen Reactions    Adhesive Rash    Augmentin [Amoxicillin-Pot Clavulanate] Hives    Codeine Nausea and Vomiting    Crestor [Rosuvastatin] Hives    Levaquin [Levofloxacin] Unknown (comments)    Morphine Hives    Oxycodone Itching    Penicillins Hives    Sulfa (Sulfonamide Antibiotics) Hives       Objective:  Visit Vitals  /66 (BP 1 Location: Left arm, BP Patient Position: Sitting)   Pulse 70   Resp 16   Ht 5' 5\" (1.651 m)   Wt 238 lb (108 kg)   LMP 10/10/2010   SpO2 95%   BMI 39.61 kg/m²     Physical Exam:   General appearance - alert, well appearing, and in no distress  Mental status - alert, oriented to person, place, and time  EYE-RADHA, EOMI, fundi normal, corneas normal, no foreign bodies  ENT-ENT exam normal, no neck nodes or sinus tenderness  Nose - normal and patent, no erythema, discharge or polyps  Mouth - mucous membranes moist, pharynx normal without lesions  Neck - supple, no significant adenopathy   Chest - clear to auscultation, no wheezes, rales or rhonchi, symmetric air entry   Heart - normal rate, regular rhythm, normal S1, S2, no murmurs, rubs, clicks or gallops   Abdomen - soft, nontender, nondistended, no masses or organomegaly  Lymph- no adenopathy palpable  Ext-peripheral pulses normal, no pedal edema, no clubbing or cyanosis  Skin-Warm and dry. no hyperpigmentation, vitiligo, or suspicious lesions  Neuro -alert, oriented, normal speech, no focal findings or movement disorder noted  Musculoskeletal- FROM, no bony abnormalities, no point tenderness    No results found for this visit on 12/17/18. All results for lab orders may not have been returned by the time this encountered was closed. Assessment/Plan:       ICD-10-CM ICD-9-CM    1.  Preop examination Z01.818 V72.84 HEMOGLOBIN A1C WITH EAG      AMB POC COMPLETE CBC,AUTOMATED ENTER      METABOLIC PANEL, BASIC      PROTHROMBIN TIME + INR      AMB POC URINALYSIS DIP STICK AUTO W/ MICRO       AMB POC EKG ROUTINE W/ 12 LEADS, INTER & REP   2. Acquired hypothyroidism E03.9 244.9    3. Type 2 diabetes mellitus without complication, without long-term current use of insulin (HCC) E11.9 250.00    4. Right-sided low back pain with right-sided sciatica, unspecified chronicity M54.41 724.3        Orders Placed This Encounter    HEMOGLOBIN A1C WITH EAG    METABOLIC PANEL, BASIC    PROTHROMBIN TIME + INR    AMB POC COMPLETE CBC,AUTOMATED ENTER    AMB POC URINALYSIS DIP STICK AUTO W/ MICRO     AMB POC EKG ROUTINE W/ 12 LEADS, INTER & REP     Order Specific Question:   Reason for Exam:     Answer:   preop   Plan:    Based on the patient's assessment today she is low to moderate perioperative risk based on history of diabetes. No further risk stratification is indicated at this time. Proceed with planned procedure. Follow-up Disposition:  Return for as scheduled. I have reviewed with the patient details of the assessment and plan and all questions were answered. Relevent patient education was performed. Verbal and/or written instructions (see AVS) provided. The most recent lab findings were reviewed with the patient. Plan was discussed with patient who verbal expressed understanding. An After Visit Summary was printed and given to the patient.       Richard Urena MD

## 2018-12-17 NOTE — PROGRESS NOTES
Chief Complaint   Patient presents with    Pre-op Exam     back surgery on 1/09/19         1. Have you been to the ER, urgent care clinic since your last visit? Hospitalized since your last visit? no    2. Have you seen or consulted any other health care providers outside of the [address] [place] [address] since your last visit? Include any pap smears or colon screening.   Yes  papsmer normal reults

## 2018-12-18 LAB
BUN SERPL-MCNC: 12 MG/DL (ref 6–24)
BUN/CREAT SERPL: 21 (ref 9–23)
CALCIUM SERPL-MCNC: 9.7 MG/DL (ref 8.7–10.2)
CHLORIDE SERPL-SCNC: 104 MMOL/L (ref 96–106)
CO2 SERPL-SCNC: 27 MMOL/L (ref 20–29)
CREAT SERPL-MCNC: 0.57 MG/DL (ref 0.57–1)
EST. AVERAGE GLUCOSE BLD GHB EST-MCNC: 174 MG/DL
GLUCOSE SERPL-MCNC: 120 MG/DL (ref 65–99)
HBA1C MFR BLD: 7.7 % (ref 4.8–5.6)
INR PPP: 0.9 (ref 0.8–1.2)
POTASSIUM SERPL-SCNC: 4 MMOL/L (ref 3.5–5.2)
PROTHROMBIN TIME: 9.7 SEC (ref 9.1–12)
SODIUM SERPL-SCNC: 145 MMOL/L (ref 134–144)

## 2018-12-19 ENCOUNTER — HOSPITAL ENCOUNTER (OUTPATIENT)
Dept: PREADMISSION TESTING | Age: 58
Discharge: HOME | End: 2018-12-19
Payer: COMMERCIAL

## 2018-12-19 VITALS
HEART RATE: 74 BPM | BODY MASS INDEX: 39.51 KG/M2 | DIASTOLIC BLOOD PRESSURE: 66 MMHG | TEMPERATURE: 97.9 F | SYSTOLIC BLOOD PRESSURE: 99 MMHG | RESPIRATION RATE: 18 BRPM | WEIGHT: 237.13 LBS | HEIGHT: 65 IN

## 2018-12-19 LAB
ABO + RH BLD: NORMAL
BLOOD GROUP ANTIBODIES SERPL: NORMAL
SPECIMEN EXP DATE BLD: NORMAL

## 2018-12-19 PROCEDURE — 86901 BLOOD TYPING SEROLOGIC RH(D): CPT

## 2018-12-19 PROCEDURE — 36415 COLL VENOUS BLD VENIPUNCTURE: CPT

## 2018-12-19 NOTE — PERIOP NOTES
Preoperative instructions reviewed with patient. Patient given six packs of CHG wipes. Instructions reviewed on use of CHG wipes. Patient given SSI infection FAQS sheet, as well as a  MRSA/MSSA treatment instruction sheet  With an explanation to patient that they will be notified if treatment instructions need to be initiated. Patient was given the opportunity to ask questions on the information provided.

## 2018-12-20 LAB
BACTERIA SPEC CULT: NORMAL
BACTERIA SPEC CULT: NORMAL
SERVICE CMNT-IMP: NORMAL

## 2018-12-20 NOTE — PERIOP NOTES
Faxed PAT testing reports to Dr. Lilia Rodriguez. Voicemail message left for Celeste/Dr. Herzog's office RE: abnormal HgbA1c. Referral to [facility] completed in [facility].

## 2019-01-02 NOTE — H&P
Kari Cortes  Location: [facility]ION[facility] OFFICE  Patient #: [id number]  : 1960   / Language: English / Race: White  Male      History of Present Illness   The patient is a 66year old male who presents for a Recheck of Chronic lumbar back pain. The injury involved the low back. This occurred at work. The injury resulted from a fall (from a 2 step stool as reported). The last clinic visit was 5 week(s) ago. Management changes made at the last visit include ordering test(s) (MRI). Symptoms include pain. Symptoms are located in the low back (right more than left). The pain radiates to the right posterior thigh. The patient describes symptoms as unchanged. The patient has a surgical history of back surgery (L4/5 Fusion by Dr. Tangela Tracey). The patient was previously evaluated in this clinic 5 week(s) ago. Previous presentation included low back pain. Past evaluation has included x-ray of the lumbar spine and MRI of the lumbar spine. Past treatment has included nonsteroidal anti-inflammatory drugs (reports celebrex causes headaches so she stopped and is taking OTC NSAIDS), physical therapy (reports the therapy works while completing it and returns when she gets home from the session) and back surgery (L4/5 PLF). Note for \"Chronic lumbar back pain\": Sina Robledo .  Ms. Rian Guillen comes in today for evaluation of lower back and right leg pain. Grady Gates is a previous patient of ours who has a previous lumbar fuson.  The patient states that her symptoms have been treated effectively in the past with therapy and conservative treatment.  THe patient states that her symptoms began in 2016. The patient complains of tightness in her posterior right thigh.  She also complains of a burning pain in her right leg.  no  loss of bowel or bladder control.       Problem List/Past Medical   Strain of muscle, fascia and tendon of lower back, subsequent encounter (S39.012D)    Strain of lumbar region, subsequent encounter (V58.89 S39.012D)    Disc degeneration of lumbar region (722.52  M51.36)    Right buttock pain (729.1  M79.18)    History of lumbar fusion (V45.4  Z98.1)    Right lumbar radiculopathy (724.4  M54.16)    REVIEW OF SYSTEMS: Systems were reviewed by the provider.    Chronic low back pain (724.2  M54.5)   PT    Allergies   Sulfa Drugs    Codeine/Codeine Derivatives    Penicillins    Crestor *ANTIHYPERLIPIDEMICS*    Morphine Sulfate ER *ANALGESICS - OPIOID*    No Known Allergies  [03/16/2017]: (Marked as Inactive)    Family History  Bladder problems   Mother. Diabetes Mellitus   Father. Respiratory Condition   Mother. Severe allergy   Mother. Migraine Headache   Mother. Asthma   Mother. Arthritis   Father, Mother. Thyroid problems   Mother. Heart Disease   Father. Hypercholesterolemia   Father, Mother. Alcohol Abuse   Father. Kidney disease   Mother. Social History  Tobacco use   Former smoker, Smokes 1.5 packs of cigarettes per day. Current work status   Full-time. Tobacco / smoke exposure   Family members smoke outdoors only. Caffeine use   3-4 drinks per day, Carbonated beverages, Coffee, Tea. Marital status   . Alcohol use   1 time, 1-2 drinks per occasion, Drinks wine, per week, Rarely drinks more than 5 drinks per occasion. Seat Belt Use   Always uses seat belts. Sun Exposure   Occasionally. No drug use    Exercise   3-4 times per week, Other, walking. Medication History   Medications Reconciled     Pregnancy / Birth History   Pregnant   No.    Past Surgical History  Tonsillectomy    Spinal Surgery    Appendectomy    Arthroscopy of Knee   right  Arthroscopy of Shoulder   right  Rotator Cuff Surgery   right  Shoulder Surgery   right  Sinus Surgery    Spinal Fusion   lower back    Diagnostic Studies History  Lumbar Spine X-ray   Date: 3/16/2017, Results: AP, lateral, flexion and extension xrays of the lumbar spine show a stable fusion at L4-5. Instrumentation is in good position.  No loosening of the hardware noted. There is degenerative disc disease at L3-4. Lumbar Spine X-ray   Date: 1/21/2016, Results: Stable lumbar fusion L4-5; no acute changes. MRI, Lumbar Spine   Date: 10/4/2018, Results:    Other Problems  Hypercholesterolemia    Migraine Headache    Allergic Urticaria    Gastroesophageal Reflux Disease    Diabetes Mellitus    Depression    Thyroid Disease    Unspecified Diagnosis        Review of Systems  General Present- Fatigue and Weight Gain. Not Present- Appetite Loss, Chills, Fever, Night Sweats and Weight Loss. Skin Present- Itching. Not Present- Nail Changes, Poor Wound Healing, Rash, Skin Color Changes, Suspicious Lesions and Yellowish Skin Color. HEENT Present- Double Vision and Sore Throat. Not Present- Decreased Hearing, Earache, Hoarseness, Jaundice/Yellow Eyes, Loose Teeth, Nose Bleed and Ringing in the Ears. Cardiovascular Present- Leg Cramps With Exertion and Palpitations. Not Present- Bluish Discoloration Of Lips Or Nails, Chest Pain, Difficulty Breathing Lying Down, Difficulty Breathing On Exertion and Swelling of Extremities. Gastrointestinal Present- Constipation and Difficulty Swallowing. Not Present- Abdominal Pain, Black, Tarry Stool, Change in Bowel Habits, Cirrhosis, Diarrhea, Nausea and Vomiting. Musculoskeletal Present- Back Pain, Joint Pain, Joint Stiffness and Joint Swelling. Neurological Present- Headaches and Unsteadiness. Not Present- Fainting, Memory Loss, Numbness, Seizures, Tingling, Tremor and Weakness. Psychiatric Present- Depression. Not Present- Anxiety and Bipolar. Endocrine Present- Excessive Thirst, Excessive Urination and Heat Intolerance. Not Present- Cold Intolerance and Excessive Hunger. Hematology Present- Abnormal Bruising . Not Present- Enlarged Lymph Nodes, Excessive bleeding and Skin Discoloration. Physical Exam  Neurologic  Sensory  Light Touch - Intact - Globally.   Overall Assessment of Muscle Strength and Tone reveals  Lower Extremities - Right Iliopsoas - 4-/5. Left Iliopsoas - 4/5. Right Tibialis Anterior - 5/5. Left Tibialis Anterior - 5/5. Right Gastroc-Soleus - 5/5. Left Gastroc-Soleus - 5/5. Right EHL - 5/5. Left EHL - 5/5. General Assessment of Reflexes  Right Ankle - Clonus is not present. Left Ankle - Clonus is not present. Reflexes (Dermatomes)  2/2 Normal - Left Achilles (L5-S2), Left Knee (L2-4), Right Achilles (L5-S2) and Right Knee (L2-4). Musculoskeletal  Global Assessment  Examination of related systems reveals - well-developed, well-nourished, in no acute distress, alert and oriented x 3. Gait and Station - normal gait and station and normal posture. Right Lower Extremity - normal strength and tone, normal range of motion without pain and no instability, subluxation or laxity. Left Lower Extremity - normal strength and tone, normal range of motion without pain and no instability, subluxation or laxity. Spine/Ribs/Pelvis  Cervical Spine - Examination of the cervical spine reveals - no tenderness to palpation, no pain, no swelling, edema or erythema, normal cervical spine movements and normal sensation. Thoracic (Dorsal) Spine - Examination of the thoracic spine reveals - no tenderness over thoracic vertebrae, no pain, normal sensation and normal thoracic spine movements. Lumbosacral Spine - Examination of the lumbosacral spine reveals - no known fractures or deformities. Inspection and Palpation - Tenderness - moderate. Assessment of pain reveals the following findings - The pain is characterized as - moderate. Location - pain refers to lower back bilaterally. ROJM - Trunk Extension - 15 degrees. Lumbar Spine Flexion - . Lumbosacral Spine - Functional Testing - Babinski Test negative, Prone Knee Bending Test negative, Slump Test negative, Straight Leg Raising Test negative.         Assessment & Plan   Right buttock pain (729.1  M79.18)  Impression: Constant right buttock pain with radiation to the posterior aspect of the thigh. She has an anterolisthesis L3-4 that is partially 6 mm with flexion. It reduces with extension. She has foraminal stenosis noted bilaterally. Widening of the L3-4 facets on MRI. She cannot get injections because of her diabetes and how sensitive she is to the cortisone. She is a candidate for a lateral lumbar interbody fusion for stabilization of the motion segment at L3-4 as well as indirect reduction of the spondylolisthesis and foraminal stenosis. The risks and benefits were discussed at length with the patient and the patient has elected to proceed. Indications for surgery include failed conservative treatment. Alternative treatments, risks and the perioperative course were discussed with the patient. All questions were answered. The risks and benefits of the procedure were explained. Benefits include definitive diagnosis, relief of pain, elimination of deformity and improved function. Risks of surgery including bleeding, infection, weakness, numbness, CSF leak, failure to improve symptoms, exacerbation of medical co-morbidities and even death were discussed with the patient. Disc degeneration of lumbar region (722.52  M51.36)  Spondylolisthesis (Acquired) (738.4  M43.10)  History of lumbar fusion (V45.4  Z98.1)  Treatment options were discussed with the patient in full.(V65.49)  Current Plans  Pt Education - How to access health information online: discussed with patient and provided information. Pt Education - Educational materials were provided.: discussed with patient and provided information. Presurgical planning was preformed with the patient today  Surgery to be scheduled  Procedure: Lateral interbody fusion L3-4 (OLIF)      Date of Surgery Update:  Espinoza Dacosta was seen and examined. History and physical has been reviewed. The patient has been examined.  There have been no significant clinical changes since the completion of the originally dated History and Physical.    Signed By: Mayra Campuzano MD     January 9, 2019 7:12 AM           Signed by Mayra Campuzano MD

## 2019-01-09 ENCOUNTER — ANESTHESIA EVENT (OUTPATIENT)
Dept: SURGERY | Age: 59
DRG: 460 | End: 2019-01-09
Payer: OTHER MISCELLANEOUS

## 2019-01-09 ENCOUNTER — HOSPITAL ENCOUNTER (INPATIENT)
Age: 59
LOS: 1 days | Discharge: HOME HEALTH | DRG: 460 | End: 2019-01-10
Attending: ORTHOPAEDIC SURGERY | Admitting: ORTHOPAEDIC SURGERY
Payer: OTHER MISCELLANEOUS

## 2019-01-09 ENCOUNTER — APPOINTMENT (OUTPATIENT)
Dept: GENERAL RADIOLOGY | Age: 59
DRG: 460 | End: 2019-01-09
Attending: ORTHOPAEDIC SURGERY
Payer: OTHER MISCELLANEOUS

## 2019-01-09 ENCOUNTER — ANESTHESIA (OUTPATIENT)
Dept: SURGERY | Age: 59
DRG: 460 | End: 2019-01-09
Payer: OTHER MISCELLANEOUS

## 2019-01-09 DIAGNOSIS — M48.062 SPINAL STENOSIS OF LUMBAR REGION WITH NEUROGENIC CLAUDICATION: Primary | ICD-10-CM

## 2019-01-09 PROBLEM — M48.061 SPINAL STENOSIS, LUMBAR: Status: ACTIVE | Noted: 2019-01-09

## 2019-01-09 LAB
ABO + RH BLD: NORMAL
BLOOD GROUP ANTIBODIES SERPL: NORMAL
GLUCOSE BLD STRIP.AUTO-MCNC: 169 MG/DL (ref 65–100)
GLUCOSE BLD STRIP.AUTO-MCNC: 170 MG/DL (ref 65–100)
GLUCOSE BLD STRIP.AUTO-MCNC: 190 MG/DL (ref 65–100)
GLUCOSE BLD STRIP.AUTO-MCNC: 198 MG/DL (ref 65–100)
SERVICE CMNT-IMP: ABNORMAL
SPECIMEN EXP DATE BLD: NORMAL

## 2019-01-09 PROCEDURE — 76010000173 HC OR TIME 3 TO 3.5 HR INTENSV-TIER 1: Performed by: ORTHOPAEDIC SURGERY

## 2019-01-09 PROCEDURE — 74011250636 HC RX REV CODE- 250/636: Performed by: PHYSICIAN ASSISTANT

## 2019-01-09 PROCEDURE — 77030011264 HC ELECTRD BLD EXT COVD -A: Performed by: ORTHOPAEDIC SURGERY

## 2019-01-09 PROCEDURE — 77030012550: Performed by: ORTHOPAEDIC SURGERY

## 2019-01-09 PROCEDURE — 0SG00A0 FUSION OF LUMBAR VERTEBRAL JOINT WITH INTERBODY FUSION DEVICE, ANTERIOR APPROACH, ANTERIOR COLUMN, OPEN APPROACH: ICD-10-PCS | Performed by: ORTHOPAEDIC SURGERY

## 2019-01-09 PROCEDURE — 76210000017 HC OR PH I REC 1.5 TO 2 HR: Performed by: ORTHOPAEDIC SURGERY

## 2019-01-09 PROCEDURE — 74011000272 HC RX REV CODE- 272: Performed by: ORTHOPAEDIC SURGERY

## 2019-01-09 PROCEDURE — 74011636637 HC RX REV CODE- 636/637: Performed by: PHYSICIAN ASSISTANT

## 2019-01-09 PROCEDURE — 77030034094 HC GRFT BN SUB ELITE TRNTY MUSC -I1: Performed by: ORTHOPAEDIC SURGERY

## 2019-01-09 PROCEDURE — 74011250637 HC RX REV CODE- 250/637: Performed by: PHYSICIAN ASSISTANT

## 2019-01-09 PROCEDURE — 77030029099 HC BN WAX SSPC -A: Performed by: ORTHOPAEDIC SURGERY

## 2019-01-09 PROCEDURE — 77030039267 HC ADH SKN EXOFIN S2SG -B: Performed by: ORTHOPAEDIC SURGERY

## 2019-01-09 PROCEDURE — C1713 ANCHOR/SCREW BN/BN,TIS/BN: HCPCS | Performed by: ORTHOPAEDIC SURGERY

## 2019-01-09 PROCEDURE — 74011250637 HC RX REV CODE- 250/637: Performed by: ANESTHESIOLOGY

## 2019-01-09 PROCEDURE — 86900 BLOOD TYPING SEROLOGIC ABO: CPT

## 2019-01-09 PROCEDURE — C1776 JOINT DEVICE (IMPLANTABLE): HCPCS | Performed by: ORTHOPAEDIC SURGERY

## 2019-01-09 PROCEDURE — 74011250636 HC RX REV CODE- 250/636

## 2019-01-09 PROCEDURE — 76060000037 HC ANESTHESIA 3 TO 3.5 HR: Performed by: ORTHOPAEDIC SURGERY

## 2019-01-09 PROCEDURE — 77030028539 HC KNF DSCTMY MTRX BYN MEDT -D: Performed by: ORTHOPAEDIC SURGERY

## 2019-01-09 PROCEDURE — 77030039536 HC SPCR SPN CLYDESDALE MEDT -I3: Performed by: ORTHOPAEDIC SURGERY

## 2019-01-09 PROCEDURE — 36415 COLL VENOUS BLD VENIPUNCTURE: CPT

## 2019-01-09 PROCEDURE — 82962 GLUCOSE BLOOD TEST: CPT

## 2019-01-09 PROCEDURE — 77030030943 HC ST DIL NIMS STIM MEDT -G: Performed by: ORTHOPAEDIC SURGERY

## 2019-01-09 PROCEDURE — 76000 FLUOROSCOPY <1 HR PHYS/QHP: CPT

## 2019-01-09 PROCEDURE — 65270000029 HC RM PRIVATE

## 2019-01-09 PROCEDURE — 77030031139 HC SUT VCRL2 J&J -A: Performed by: ORTHOPAEDIC SURGERY

## 2019-01-09 PROCEDURE — 77030040000 HC FCPS BPLR DISP KIRW -B: Performed by: ORTHOPAEDIC SURGERY

## 2019-01-09 PROCEDURE — 74011250636 HC RX REV CODE- 250/636: Performed by: ANESTHESIOLOGY

## 2019-01-09 PROCEDURE — 77030032490 HC SLV COMPR SCD KNE COVD -B: Performed by: ORTHOPAEDIC SURGERY

## 2019-01-09 PROCEDURE — 77030008467 HC STPLR SKN COVD -B: Performed by: ORTHOPAEDIC SURGERY

## 2019-01-09 PROCEDURE — 77030034850: Performed by: ORTHOPAEDIC SURGERY

## 2019-01-09 PROCEDURE — 0ST20ZZ RESECTION OF LUMBAR VERTEBRAL DISC, OPEN APPROACH: ICD-10-PCS | Performed by: ORTHOPAEDIC SURGERY

## 2019-01-09 PROCEDURE — 74011000250 HC RX REV CODE- 250: Performed by: ORTHOPAEDIC SURGERY

## 2019-01-09 DEVICE — GRAFT BNE SUB M CANC FRZN MORSELIZED W/ VIABLE CELL TRINITY: Type: IMPLANTABLE DEVICE | Site: SPINE LUMBAR | Status: FUNCTIONAL

## 2019-01-09 DEVICE — PLATE 2140002 OLIF25 2-HOLE PLATE LARGE
Type: IMPLANTABLE DEVICE | Site: SPINE LUMBAR | Status: FUNCTIONAL
Brand: PIVOX™ OBLIQUE LATERAL SPINAL SYSTEM

## 2019-01-09 DEVICE — CAGE 4986050 CDALE PTC 18MM 6 DEG 10X50
Type: IMPLANTABLE DEVICE | Site: SPINE LUMBAR | Status: FUNCTIONAL
Brand: CLYDESDALE PTC™ SPINAL SYSTEM

## 2019-01-09 RX ORDER — POLYETHYLENE GLYCOL 3350 17 G/17G
17 POWDER, FOR SOLUTION ORAL DAILY
Status: DISCONTINUED | OUTPATIENT
Start: 2019-01-10 | End: 2019-01-10 | Stop reason: HOSPADM

## 2019-01-09 RX ORDER — SODIUM CHLORIDE 0.9 % (FLUSH) 0.9 %
5-40 SYRINGE (ML) INJECTION AS NEEDED
Status: DISCONTINUED | OUTPATIENT
Start: 2019-01-09 | End: 2019-01-10 | Stop reason: HOSPADM

## 2019-01-09 RX ORDER — CYCLOBENZAPRINE HCL 10 MG
10 TABLET ORAL
Status: DISCONTINUED | OUTPATIENT
Start: 2019-01-09 | End: 2019-01-10 | Stop reason: HOSPADM

## 2019-01-09 RX ORDER — HYDROMORPHONE HYDROCHLORIDE 2 MG/1
2 TABLET ORAL
Status: DISCONTINUED | OUTPATIENT
Start: 2019-01-09 | End: 2019-01-10 | Stop reason: HOSPADM

## 2019-01-09 RX ORDER — INSULIN LISPRO 100 [IU]/ML
INJECTION, SOLUTION INTRAVENOUS; SUBCUTANEOUS
Status: DISCONTINUED | OUTPATIENT
Start: 2019-01-09 | End: 2019-01-10 | Stop reason: HOSPADM

## 2019-01-09 RX ORDER — KETOROLAC TROMETHAMINE 30 MG/ML
30 INJECTION, SOLUTION INTRAMUSCULAR; INTRAVENOUS EVERY 6 HOURS
Status: COMPLETED | OUTPATIENT
Start: 2019-01-09 | End: 2019-01-10

## 2019-01-09 RX ORDER — NALOXONE HYDROCHLORIDE 0.4 MG/ML
0.4 INJECTION, SOLUTION INTRAMUSCULAR; INTRAVENOUS; SUBCUTANEOUS AS NEEDED
Status: DISCONTINUED | OUTPATIENT
Start: 2019-01-09 | End: 2019-01-10 | Stop reason: HOSPADM

## 2019-01-09 RX ORDER — SODIUM CHLORIDE 0.9 % (FLUSH) 0.9 %
5-40 SYRINGE (ML) INJECTION AS NEEDED
Status: DISCONTINUED | OUTPATIENT
Start: 2019-01-09 | End: 2019-01-09 | Stop reason: HOSPADM

## 2019-01-09 RX ORDER — DIPHENHYDRAMINE HYDROCHLORIDE 50 MG/ML
INJECTION, SOLUTION INTRAMUSCULAR; INTRAVENOUS
Status: DISPENSED
Start: 2019-01-09 | End: 2019-01-10

## 2019-01-09 RX ORDER — FENTANYL CITRATE 50 UG/ML
50 INJECTION, SOLUTION INTRAMUSCULAR; INTRAVENOUS AS NEEDED
Status: DISCONTINUED | OUTPATIENT
Start: 2019-01-09 | End: 2019-01-09 | Stop reason: HOSPADM

## 2019-01-09 RX ORDER — HYDROMORPHONE HYDROCHLORIDE 4 MG/1
4 TABLET ORAL
Status: DISCONTINUED | OUTPATIENT
Start: 2019-01-09 | End: 2019-01-10 | Stop reason: HOSPADM

## 2019-01-09 RX ORDER — SODIUM CHLORIDE 0.9 % (FLUSH) 0.9 %
5-40 SYRINGE (ML) INJECTION EVERY 8 HOURS
Status: DISCONTINUED | OUTPATIENT
Start: 2019-01-09 | End: 2019-01-09 | Stop reason: HOSPADM

## 2019-01-09 RX ORDER — INSULIN LISPRO 100 [IU]/ML
INJECTION, SOLUTION INTRAVENOUS; SUBCUTANEOUS
Status: DISCONTINUED | OUTPATIENT
Start: 2019-01-09 | End: 2019-01-09 | Stop reason: SDUPTHER

## 2019-01-09 RX ORDER — INSULIN GLARGINE 100 [IU]/ML
60 INJECTION, SOLUTION SUBCUTANEOUS
Status: DISCONTINUED | OUTPATIENT
Start: 2019-01-09 | End: 2019-01-10 | Stop reason: HOSPADM

## 2019-01-09 RX ORDER — FENTANYL CITRATE 50 UG/ML
INJECTION, SOLUTION INTRAMUSCULAR; INTRAVENOUS AS NEEDED
Status: DISCONTINUED | OUTPATIENT
Start: 2019-01-09 | End: 2019-01-09 | Stop reason: HOSPADM

## 2019-01-09 RX ORDER — PROPOFOL 10 MG/ML
INJECTION, EMULSION INTRAVENOUS
Status: DISCONTINUED | OUTPATIENT
Start: 2019-01-09 | End: 2019-01-09 | Stop reason: HOSPADM

## 2019-01-09 RX ORDER — LIDOCAINE HYDROCHLORIDE 10 MG/ML
0.1 INJECTION, SOLUTION EPIDURAL; INFILTRATION; INTRACAUDAL; PERINEURAL AS NEEDED
Status: DISCONTINUED | OUTPATIENT
Start: 2019-01-09 | End: 2019-01-09 | Stop reason: HOSPADM

## 2019-01-09 RX ORDER — DIPHENHYDRAMINE HYDROCHLORIDE 50 MG/ML
12.5 INJECTION, SOLUTION INTRAMUSCULAR; INTRAVENOUS ONCE
Status: COMPLETED | OUTPATIENT
Start: 2019-01-09 | End: 2019-01-09

## 2019-01-09 RX ORDER — DEXTROSE 50 % IN WATER (D50W) INTRAVENOUS SYRINGE
12.5-25 AS NEEDED
Status: DISCONTINUED | OUTPATIENT
Start: 2019-01-09 | End: 2019-01-10 | Stop reason: HOSPADM

## 2019-01-09 RX ORDER — SODIUM CHLORIDE 9 MG/ML
25 INJECTION, SOLUTION INTRAVENOUS CONTINUOUS
Status: DISCONTINUED | OUTPATIENT
Start: 2019-01-09 | End: 2019-01-09 | Stop reason: HOSPADM

## 2019-01-09 RX ORDER — LEVOTHYROXINE SODIUM 100 UG/1
200 TABLET ORAL
Status: DISCONTINUED | OUTPATIENT
Start: 2019-01-10 | End: 2019-01-10 | Stop reason: HOSPADM

## 2019-01-09 RX ORDER — ONDANSETRON 2 MG/ML
4 INJECTION INTRAMUSCULAR; INTRAVENOUS AS NEEDED
Status: DISCONTINUED | OUTPATIENT
Start: 2019-01-09 | End: 2019-01-09 | Stop reason: HOSPADM

## 2019-01-09 RX ORDER — MIDAZOLAM HYDROCHLORIDE 1 MG/ML
1 INJECTION, SOLUTION INTRAMUSCULAR; INTRAVENOUS AS NEEDED
Status: DISCONTINUED | OUTPATIENT
Start: 2019-01-09 | End: 2019-01-09 | Stop reason: HOSPADM

## 2019-01-09 RX ORDER — SODIUM CHLORIDE 0.9 % (FLUSH) 0.9 %
5-40 SYRINGE (ML) INJECTION EVERY 8 HOURS
Status: DISCONTINUED | OUTPATIENT
Start: 2019-01-09 | End: 2019-01-10 | Stop reason: HOSPADM

## 2019-01-09 RX ORDER — SODIUM CHLORIDE 9 MG/ML
125 INJECTION, SOLUTION INTRAVENOUS CONTINUOUS
Status: DISPENSED | OUTPATIENT
Start: 2019-01-09 | End: 2019-01-10

## 2019-01-09 RX ORDER — HYDROMORPHONE HYDROCHLORIDE 2 MG/ML
0.5 INJECTION, SOLUTION INTRAMUSCULAR; INTRAVENOUS; SUBCUTANEOUS
Status: DISCONTINUED | OUTPATIENT
Start: 2019-01-09 | End: 2019-01-10 | Stop reason: HOSPADM

## 2019-01-09 RX ORDER — VANCOMYCIN/0.9 % SOD CHLORIDE 1.5G/250ML
1500 PLASTIC BAG, INJECTION (ML) INTRAVENOUS EVERY 12 HOURS
Status: COMPLETED | OUTPATIENT
Start: 2019-01-09 | End: 2019-01-09

## 2019-01-09 RX ORDER — SCOLOPAMINE TRANSDERMAL SYSTEM 1 MG/1
1 PATCH, EXTENDED RELEASE TRANSDERMAL ONCE
Status: DISCONTINUED | OUTPATIENT
Start: 2019-01-09 | End: 2019-01-10 | Stop reason: HOSPADM

## 2019-01-09 RX ORDER — ONDANSETRON 2 MG/ML
INJECTION INTRAMUSCULAR; INTRAVENOUS AS NEEDED
Status: DISCONTINUED | OUTPATIENT
Start: 2019-01-09 | End: 2019-01-09 | Stop reason: HOSPADM

## 2019-01-09 RX ORDER — HYDROMORPHONE HYDROCHLORIDE 2 MG/ML
INJECTION, SOLUTION INTRAMUSCULAR; INTRAVENOUS; SUBCUTANEOUS AS NEEDED
Status: DISCONTINUED | OUTPATIENT
Start: 2019-01-09 | End: 2019-01-09 | Stop reason: HOSPADM

## 2019-01-09 RX ORDER — FACIAL-BODY WIPES
10 EACH TOPICAL DAILY PRN
Status: DISCONTINUED | OUTPATIENT
Start: 2019-01-11 | End: 2019-01-10 | Stop reason: HOSPADM

## 2019-01-09 RX ORDER — VANCOMYCIN 2 GRAM/500 ML IN 0.9 % SODIUM CHLORIDE INTRAVENOUS
2000
Status: COMPLETED | OUTPATIENT
Start: 2019-01-09 | End: 2019-01-09

## 2019-01-09 RX ORDER — SODIUM CHLORIDE, SODIUM LACTATE, POTASSIUM CHLORIDE, CALCIUM CHLORIDE 600; 310; 30; 20 MG/100ML; MG/100ML; MG/100ML; MG/100ML
125 INJECTION, SOLUTION INTRAVENOUS CONTINUOUS
Status: DISCONTINUED | OUTPATIENT
Start: 2019-01-09 | End: 2019-01-09 | Stop reason: HOSPADM

## 2019-01-09 RX ORDER — MAGNESIUM SULFATE 100 %
4 CRYSTALS MISCELLANEOUS AS NEEDED
Status: DISCONTINUED | OUTPATIENT
Start: 2019-01-09 | End: 2019-01-10 | Stop reason: HOSPADM

## 2019-01-09 RX ORDER — LORAZEPAM 1 MG/1
1 TABLET ORAL
Status: DISCONTINUED | OUTPATIENT
Start: 2019-01-09 | End: 2019-01-10 | Stop reason: HOSPADM

## 2019-01-09 RX ORDER — MIDAZOLAM HYDROCHLORIDE 1 MG/ML
INJECTION, SOLUTION INTRAMUSCULAR; INTRAVENOUS AS NEEDED
Status: DISCONTINUED | OUTPATIENT
Start: 2019-01-09 | End: 2019-01-09 | Stop reason: HOSPADM

## 2019-01-09 RX ORDER — SODIUM CHLORIDE, SODIUM LACTATE, POTASSIUM CHLORIDE, CALCIUM CHLORIDE 600; 310; 30; 20 MG/100ML; MG/100ML; MG/100ML; MG/100ML
25 INJECTION, SOLUTION INTRAVENOUS CONTINUOUS
Status: DISCONTINUED | OUTPATIENT
Start: 2019-01-09 | End: 2019-01-09 | Stop reason: HOSPADM

## 2019-01-09 RX ORDER — ONDANSETRON 2 MG/ML
4 INJECTION INTRAMUSCULAR; INTRAVENOUS
Status: ACTIVE | OUTPATIENT
Start: 2019-01-09 | End: 2019-01-10

## 2019-01-09 RX ORDER — SODIUM CHLORIDE, SODIUM LACTATE, POTASSIUM CHLORIDE, CALCIUM CHLORIDE 600; 310; 30; 20 MG/100ML; MG/100ML; MG/100ML; MG/100ML
INJECTION, SOLUTION INTRAVENOUS
Status: DISCONTINUED | OUTPATIENT
Start: 2019-01-09 | End: 2019-01-09 | Stop reason: HOSPADM

## 2019-01-09 RX ORDER — DIPHENHYDRAMINE HCL 12.5MG/5ML
12.5 ELIXIR ORAL ONCE
Status: COMPLETED | OUTPATIENT
Start: 2019-01-09 | End: 2019-01-09

## 2019-01-09 RX ORDER — AMOXICILLIN 250 MG
1 CAPSULE ORAL 2 TIMES DAILY
Status: DISCONTINUED | OUTPATIENT
Start: 2019-01-09 | End: 2019-01-10 | Stop reason: HOSPADM

## 2019-01-09 RX ORDER — LISINOPRIL 10 MG/1
10 TABLET ORAL EVERY EVENING
Status: DISCONTINUED | OUTPATIENT
Start: 2019-01-09 | End: 2019-01-10 | Stop reason: HOSPADM

## 2019-01-09 RX ORDER — ACETAMINOPHEN 500 MG
1000 TABLET ORAL EVERY 6 HOURS
Status: DISCONTINUED | OUTPATIENT
Start: 2019-01-09 | End: 2019-01-10 | Stop reason: HOSPADM

## 2019-01-09 RX ORDER — MIDAZOLAM HYDROCHLORIDE 1 MG/ML
0.5 INJECTION, SOLUTION INTRAMUSCULAR; INTRAVENOUS
Status: DISCONTINUED | OUTPATIENT
Start: 2019-01-09 | End: 2019-01-09 | Stop reason: HOSPADM

## 2019-01-09 RX ORDER — FENTANYL CITRATE 50 UG/ML
25 INJECTION, SOLUTION INTRAMUSCULAR; INTRAVENOUS
Status: COMPLETED | OUTPATIENT
Start: 2019-01-09 | End: 2019-01-09

## 2019-01-09 RX ORDER — PROPOFOL 10 MG/ML
INJECTION, EMULSION INTRAVENOUS AS NEEDED
Status: DISCONTINUED | OUTPATIENT
Start: 2019-01-09 | End: 2019-01-09 | Stop reason: HOSPADM

## 2019-01-09 RX ADMIN — FENTANYL CITRATE 25 MCG: 50 INJECTION INTRAMUSCULAR; INTRAVENOUS at 15:00

## 2019-01-09 RX ADMIN — ACETAMINOPHEN 1000 MG: 500 TABLET ORAL at 18:32

## 2019-01-09 RX ADMIN — FENTANYL CITRATE 25 MCG: 50 INJECTION INTRAMUSCULAR; INTRAVENOUS at 15:30

## 2019-01-09 RX ADMIN — HYDROMORPHONE HYDROCHLORIDE 0.5 MG: 2 INJECTION, SOLUTION INTRAMUSCULAR; INTRAVENOUS; SUBCUTANEOUS at 13:13

## 2019-01-09 RX ADMIN — HYDROMORPHONE HYDROCHLORIDE 2 MG: 2 TABLET ORAL at 18:32

## 2019-01-09 RX ADMIN — VANCOMYCIN HYDROCHLORIDE 2000 MG: 10 INJECTION, POWDER, LYOPHILIZED, FOR SOLUTION INTRAVENOUS at 08:59

## 2019-01-09 RX ADMIN — FENTANYL CITRATE 100 MCG: 50 INJECTION, SOLUTION INTRAMUSCULAR; INTRAVENOUS at 11:38

## 2019-01-09 RX ADMIN — ONDANSETRON 4 MG: 2 INJECTION INTRAMUSCULAR; INTRAVENOUS at 15:00

## 2019-01-09 RX ADMIN — VANCOMYCIN HYDROCHLORIDE 1500 MG: 10 INJECTION, POWDER, LYOPHILIZED, FOR SOLUTION INTRAVENOUS at 21:34

## 2019-01-09 RX ADMIN — FENTANYL CITRATE 50 MCG: 50 INJECTION, SOLUTION INTRAMUSCULAR; INTRAVENOUS at 11:56

## 2019-01-09 RX ADMIN — MIDAZOLAM HYDROCHLORIDE 1 MG: 1 INJECTION, SOLUTION INTRAMUSCULAR; INTRAVENOUS at 11:17

## 2019-01-09 RX ADMIN — FENTANYL CITRATE 25 MCG: 50 INJECTION INTRAMUSCULAR; INTRAVENOUS at 15:46

## 2019-01-09 RX ADMIN — HYDROMORPHONE HYDROCHLORIDE 0.5 MG: 2 INJECTION, SOLUTION INTRAMUSCULAR; INTRAVENOUS; SUBCUTANEOUS at 14:48

## 2019-01-09 RX ADMIN — PROPOFOL 30 MG: 10 INJECTION, EMULSION INTRAVENOUS at 11:31

## 2019-01-09 RX ADMIN — DOCUSATE SODIUM AND SENNOSIDES 1 TABLET: 8.6; 5 TABLET, FILM COATED ORAL at 18:32

## 2019-01-09 RX ADMIN — SODIUM CHLORIDE, SODIUM LACTATE, POTASSIUM CHLORIDE, CALCIUM CHLORIDE: 600; 310; 30; 20 INJECTION, SOLUTION INTRAVENOUS at 14:14

## 2019-01-09 RX ADMIN — MIDAZOLAM 1 MG: 1 INJECTION INTRAMUSCULAR; INTRAVENOUS at 11:23

## 2019-01-09 RX ADMIN — MIDAZOLAM HYDROCHLORIDE 2 MG: 1 INJECTION, SOLUTION INTRAMUSCULAR; INTRAVENOUS at 11:31

## 2019-01-09 RX ADMIN — KETOROLAC TROMETHAMINE 30 MG: 30 INJECTION, SOLUTION INTRAMUSCULAR at 18:32

## 2019-01-09 RX ADMIN — DIPHENHYDRAMINE HYDROCHLORIDE 12.5 MG: 12.5 SOLUTION ORAL at 21:34

## 2019-01-09 RX ADMIN — SODIUM CHLORIDE 125 ML/HR: 900 INJECTION, SOLUTION INTRAVENOUS at 15:00

## 2019-01-09 RX ADMIN — INSULIN LISPRO 2 UNITS: 100 INJECTION, SOLUTION INTRAVENOUS; SUBCUTANEOUS at 16:25

## 2019-01-09 RX ADMIN — DIPHENHYDRAMINE HYDROCHLORIDE 12.5 MG: 50 INJECTION, SOLUTION INTRAMUSCULAR; INTRAVENOUS at 16:02

## 2019-01-09 RX ADMIN — Medication 20 ML: at 15:00

## 2019-01-09 RX ADMIN — ONDANSETRON 4 MG: 2 INJECTION INTRAMUSCULAR; INTRAVENOUS at 14:20

## 2019-01-09 RX ADMIN — SODIUM CHLORIDE, SODIUM LACTATE, POTASSIUM CHLORIDE, CALCIUM CHLORIDE: 600; 310; 30; 20 INJECTION, SOLUTION INTRAVENOUS at 11:31

## 2019-01-09 RX ADMIN — FENTANYL CITRATE 25 MCG: 50 INJECTION INTRAMUSCULAR; INTRAVENOUS at 15:40

## 2019-01-09 RX ADMIN — CYCLOBENZAPRINE HYDROCHLORIDE 10 MG: 10 TABLET, FILM COATED ORAL at 15:19

## 2019-01-09 RX ADMIN — PROPOFOL 120 MCG/KG/MIN: 10 INJECTION, EMULSION INTRAVENOUS at 12:17

## 2019-01-09 RX ADMIN — HYDROMORPHONE HYDROCHLORIDE 4 MG: 4 TABLET ORAL at 23:07

## 2019-01-09 NOTE — BRIEF OP NOTE
BRIEF OPERATIVE NOTE    Date of Procedure: 1/9/2019   Preoperative Diagnosis: PRIOR FUSION, SPONDYLOLISTHESIS, DDD, CHRONIC LOW BACK PAIN  Postoperative Diagnosis: PRIOR FUSION, SPONDYLOLISTHESIS, DDD, CHRONIC LOW BACK PAIN    Procedure(s):  LATERAL INTERBODY FUSION L3-4 WITH IMAGE GUIDANCE  Surgeon(s) and Role: Dayana Arana MD - Primary         Surgical Assistant: Kim Ca PA-C     Surgical Staff:  Circ-1: Sheeba Hampton RN  Circ-2: Carlos Martinez RN  Circ-Relief: Doris Palacios RN  Physician Assistant: Neymar Ferrari [facility]  Scrub Tech-1: Bren Loza RN-2: Gaby Cortes  Event Time In Time Out   Incision Start 1217    Incision Close 1430      Anesthesia: General   Estimated Blood Loss: minimal    Specimens: * No specimens in log *   Findings: DDD    Complications: none  Implants:   Implant Name Type Inv.  Item Serial No.  Lot No. LRB No. Used Action   GRAFT BNE [employer] --  - [id number]  GRA[employer] --  [id number] MUSCULOSKELETAL TRANS N/A N/A 1 Implanted   SPACER 18MM 6DEG [id number] -- [employer] PTC - SN/A  SPACER 18MM 6DEG [id number] -- [employer] PTC N/A [employer] [id number] N/A 1 Implanted   SCR BNE LUMBAR 5.5X35MM -- PIVOX - SN/A  SCR BNE LUMBAR 5.5X35MM -- PIVOX N/A [employer] [id number] N/A 2 Implanted   PLATE SPNE LAT LUMBAR 2H LG -- PIVOX - SN/A  PLATE SPNE LAT LUMBAR 2H LG -- PIVOX N/A [employer]K [id number] N/A 1 Implanted

## 2019-01-09 NOTE — ROUTINE PROCESS
TRANSFER - OUT REPORT:    Verbal report given to FREDERICK Flynn(name) on Claude Mathieu  being transferred to [facility](unit) for routine post - op       Report consisted of patients Situation, Background, Assessment and   Recommendations(SBAR). Time Pre op antibiotic given:0859  Anesthesia Stop time: 0992  [place] Present on Transfer to floor:YES  Order for [place] on Chart:YES  Discharge Prescriptions with Chart:NO    Information from the following report(s) SBAR, Kardex, OR Summary, Procedure Summary and Cardiac Rhythm NSR was reviewed with the receiving nurse. Opportunity for questions and clarification was provided. Is the patient on 02? YES       L/Min 2    Is the patient on a monitor? NO    Is the nurse transporting with the patient? NO    Surgical Waiting Area notified of patient's transfer from PACU? YES      The following personal items collected during your admission accompanied patient upon transfer:   Dental Appliance: Dental Appliances: None  Vision: Visual Aid: None  Hearing Aid:    Jewelry: Jewelry: None  Clothing: Clothing: (valuables in stretcher with pt in PACU)  Other Valuables: Other Valuables: (back brace to O. R)  Valuables sent to safe:

## 2019-01-09 NOTE — ANESTHESIA PREPROCEDURE EVALUATION
Anesthetic History PONV Review of Systems / Medical History Patient summary reviewed, nursing notes reviewed and pertinent labs reviewed Pulmonary Within defined limits Neuro/Psych Within defined limits Cardiovascular Exercise tolerance: >4 METS 
  
GI/Hepatic/Renal 
  
GERD: well controlled Endo/Other Diabetes: poorly controlled, type 2, using insulin Hypothyroidism: well controlled Morbid obesity Other Findings Physical Exam 
 
Airway Mallampati: II 
TM Distance: > 6 cm Neck ROM: normal range of motion Mouth opening: Normal 
 
 Cardiovascular Regular rate and rhythm,  S1 and S2 normal,  no murmur, click, rub, or gallop Dental 
 
Dentition: Upper dentition intact and Lower dentition intact Pulmonary Breath sounds clear to auscultation Abdominal 
GI exam deferred Other Findings Comments: Febrile Anesthetic Plan ASA: 3 Anesthesia type: general 
 
 
 
 
Induction: Intravenous Anesthetic plan and risks discussed with: Patient

## 2019-01-09 NOTE — PROGRESS NOTES
Unable to obtain sample for type and screen after two attempts. Will be obtained after patient asleep.

## 2019-01-09 NOTE — ANESTHESIA POSTPROCEDURE EVALUATION
Post-Anesthesia Evaluation and Assessment Patient: Neha Elaine MRN: [id number]  SSN: [id number] YOB: 1960  Age: 66 y.o. Sex: female I have evaluated the patient and they are stable and ready for discharge from the PACU. Cardiovascular Function/Vital Signs Visit Vitals /72 (BP 1 Location: Right arm, BP Patient Position: At rest) Pulse 85 Temp 36.6 °C (97.9 °F) Resp 12 Ht 5' 5\" (1.651 m) Wt 107.6 kg (237 lb 2 oz) SpO2 97% BMI 39.46 kg/m² Patient is status post General anesthesia for Procedure(s): LATERAL INTERBODY FUSION L3-4 WITH IMAGE GUIDANCE. Nausea/Vomiting: None Postoperative hydration reviewed and adequate. Pain: 
Pain Scale 1: Visual (01/09/19 1545) Pain Intensity 1: 0 (01/09/19 1545) Managed Neurological Status:  
Neuro (WDL): Within Defined Limits (01/09/19 1545) Neuro LUE Motor Response: Purposeful (01/09/19 1545) LLE Motor Response: Purposeful (01/09/19 1545) RUE Motor Response: Purposeful (01/09/19 1545) RLE Motor Response: Purposeful (01/09/19 1545) At baseline Mental Status, Level of Consciousness: Alert and  oriented to person, place, and time Pulmonary Status:  
O2 Device: Nasal cannula (01/09/19 1545) Adequate oxygenation and airway patent Complications related to anesthesia: None Post-anesthesia assessment completed. No concerns Signed By: David Lehman MD   
 January 9, 2019 Procedure(s): LATERAL INTERBODY FUSION L3-4 WITH IMAGE GUIDANCE. Anesthesia Post Evaluation Comments: Post-Anesthesia Evaluation and Assessment I have evaluated the patient and they are ready for PACU discharge. Patient: Neha Elaine MRN: [id number]  SSN: [id number] YOB: 1960  Age: 66 y.o. Sex: female Cardiovascular Function/Vital Signs /72 (BP 1 Location: Right arm, BP Patient Position: At rest) Pulse 85   Temp 36.6 °C (97.9 °F)   Resp 12   Ht 5' 5\" (1.651 m)   Wt 107.6 kg (237 lb 2 oz)   SpO2 97%   BMI 39.46 kg/m² Patient is status post General anesthesia for Procedure(s): LATERAL INTERBODY FUSION L3-4 WITH IMAGE GUIDANCE. Nausea/Vomiting: None Postoperative hydration reviewed and adequate. Pain: 
Pain Scale 1: Visual (01/09/19 1545) Pain Intensity 1: 0 (01/09/19 1545) Managed Neurological Status:  
Neuro (WDL): Within Defined Limits (01/09/19 1545) Neuro LUE Motor Response: Purposeful (01/09/19 1545) LLE Motor Response: Purposeful (01/09/19 1545) RUE Motor Response: Purposeful (01/09/19 1545) RLE Motor Response: Purposeful (01/09/19 1545) At baseline Mental Status, Level of Consciousness: Alert and  oriented to person, place, and time Pulmonary Status:  
O2 Device: Nasal cannula (01/09/19 1545) Adequate oxygenation and airway patent Complications related to anesthesia: None Post-anesthesia assessment completed. No concerns Signed By: Cristiana Montoya MD  
 January 9, 2019 Visit Vitals /72 (BP 1 Location: Right arm, BP Patient Position: At rest) Pulse 85 Temp 36.6 °C (97.9 °F) Resp 12 Ht 5' 5\" (1.651 m) Wt 107.6 kg (237 lb 2 oz) SpO2 97% BMI 39.46 kg/m²

## 2019-01-09 NOTE — OP NOTES
[address] [place] [address] REPORT     Ruddy Okeefe  MR#: [id number]  : 1960  ACCOUNT #: [de-identified]   DATE OF SERVICE: 2019    PREOPERATIVE DIAGNOSES:  Spondylolisthesis L3-4. Lumbar stenosis L3-L4, status post L4-L5 fusion. POSTOPERATIVE DIAGNOSIS:  Spondylolisthesis L3-4. Lumbar stenosis L3-L4, status post L4-L5 fusion. PROCEDURE:  Anterolateral diskectomy L3-L4, anterolateral interbody fusion L3-4, anterolateral plating L3-L4. SURGEON:  Gabbi Gutierrez MD    ASSISTANT:  Daisy Oneill PA-C    ANESTHESIA:  General    ESTIMATED BLOOD LOSS:  Minimal.    COMPLICATIONS:  None. SPECIMENS REMOVED:  None    IMPLANTS:  [place] Lateral interbody and anterolateral Plate    INDICATIONS:  This is a pleasant 82-year-old female approximately 4 years out from mid left fusion at L4-L5, has developed some spondylolisthesis at L4-L5 as well as stenosis. She had failed conservative treatment, understood the risks and benefits, elected to proceed. DESCRIPTION OF PROCEDURE:  Patient was identified, brought to the operative suite, underwent general anesthesia without difficulty. Neuromonitoring was placed, baselines obtained. The patient was stable throughout the surgical procedure. Back was prepped and draped sterilely. Patient was placed into the lateral position with an axilla roll on the right-hand side. She was prepped and draped sterilely. At that time, we then did a time-out. In the PSIS, a Schanz pin was placed for the array, which we then did an intraoperative CT scan spin and then using the Stratavia navigation system, we then navigated in an anterior oblique exposure to the disk space at L3-4 just pulling the psoas back posteriorly. Annulotomy into the disk space was then performed followed by complete diskectomy. Cartilage endplates were also removed with the disk space jason, pituitary rongeurs.   We evacuated all of the disk and released the contralateral annulus with Melanie. We then did a 10 x 50 mm graft bilaterally to trial, which provided good anterior column reconstruction as well as reduction of spondylolisthesis. We then rasped the endplates to lightly bleeding bone, packed the 10 x 50 [place] PTC graft with the Arlene allograft. The impact was placed under live fluoroscopy. Neuromonitoring was stable. We then contoured a large plate to the anterolateral portion of the spine at the L3-4 level. Then had 35 mm 5.0 screws for fixation of the plate to the vertebral bodies. Good fixation was obtained. The patient was returned to a standard closure. The patient returned to the PACU in stable condition.       Arnaldo Davila MD       Maleserene Parents / RN  D: 01/09/2019 14:19     T: 01/09/2019 17:49  JOB #: [id number]

## 2019-01-10 VITALS
DIASTOLIC BLOOD PRESSURE: 60 MMHG | WEIGHT: 237.13 LBS | TEMPERATURE: 98.6 F | SYSTOLIC BLOOD PRESSURE: 103 MMHG | BODY MASS INDEX: 39.51 KG/M2 | RESPIRATION RATE: 18 BRPM | OXYGEN SATURATION: 96 % | HEART RATE: 77 BPM | HEIGHT: 65 IN

## 2019-01-10 LAB
ANION GAP SERPL CALC-SCNC: 7 MMOL/L (ref 5–15)
BUN SERPL-MCNC: 11 MG/DL (ref 6–20)
BUN/CREAT SERPL: 20 (ref 12–20)
CALCIUM SERPL-MCNC: 8.1 MG/DL (ref 8.5–10.1)
CHLORIDE SERPL-SCNC: 110 MMOL/L (ref 97–108)
CO2 SERPL-SCNC: 24 MMOL/L (ref 21–32)
CREAT SERPL-MCNC: 0.56 MG/DL (ref 0.55–1.02)
GLUCOSE BLD STRIP.AUTO-MCNC: 143 MG/DL (ref 65–100)
GLUCOSE BLD STRIP.AUTO-MCNC: 170 MG/DL (ref 65–100)
GLUCOSE BLD STRIP.AUTO-MCNC: 171 MG/DL (ref 65–100)
GLUCOSE SERPL-MCNC: 160 MG/DL (ref 65–100)
HGB BLD-MCNC: 13.1 G/DL (ref 11.5–16)
POTASSIUM SERPL-SCNC: 4.1 MMOL/L (ref 3.5–5.1)
SERVICE CMNT-IMP: ABNORMAL
SODIUM SERPL-SCNC: 141 MMOL/L (ref 136–145)

## 2019-01-10 PROCEDURE — 74011000250 HC RX REV CODE- 250: Performed by: PHYSICIAN ASSISTANT

## 2019-01-10 PROCEDURE — 77030012893

## 2019-01-10 PROCEDURE — 97165 OT EVAL LOW COMPLEX 30 MIN: CPT

## 2019-01-10 PROCEDURE — 97116 GAIT TRAINING THERAPY: CPT

## 2019-01-10 PROCEDURE — 36415 COLL VENOUS BLD VENIPUNCTURE: CPT

## 2019-01-10 PROCEDURE — 82962 GLUCOSE BLOOD TEST: CPT

## 2019-01-10 PROCEDURE — 97161 PT EVAL LOW COMPLEX 20 MIN: CPT

## 2019-01-10 PROCEDURE — 74011250637 HC RX REV CODE- 250/637: Performed by: PHYSICIAN ASSISTANT

## 2019-01-10 PROCEDURE — 97530 THERAPEUTIC ACTIVITIES: CPT

## 2019-01-10 PROCEDURE — 80048 BASIC METABOLIC PNL TOTAL CA: CPT

## 2019-01-10 PROCEDURE — 74011250636 HC RX REV CODE- 250/636: Performed by: PHYSICIAN ASSISTANT

## 2019-01-10 PROCEDURE — 85018 HEMOGLOBIN: CPT

## 2019-01-10 PROCEDURE — 74011636637 HC RX REV CODE- 636/637: Performed by: PHYSICIAN ASSISTANT

## 2019-01-10 PROCEDURE — 97535 SELF CARE MNGMENT TRAINING: CPT

## 2019-01-10 RX ORDER — NALOXONE HYDROCHLORIDE 4 MG/.1ML
SPRAY NASAL
Qty: 2 EACH | Refills: 0 | Status: SHIPPED | OUTPATIENT
Start: 2019-01-10 | End: 2020-02-27

## 2019-01-10 RX ORDER — CYCLOBENZAPRINE HCL 10 MG
10 TABLET ORAL
Qty: 20 TAB | Refills: 0 | Status: SHIPPED | OUTPATIENT
Start: 2019-01-10 | End: 2019-01-10 | Stop reason: SDUPTHER

## 2019-01-10 RX ORDER — HYDROMORPHONE HYDROCHLORIDE 2 MG/1
2 TABLET ORAL
Qty: 60 TAB | Refills: 0 | Status: SHIPPED | OUTPATIENT
Start: 2019-01-10 | End: 2019-05-23 | Stop reason: ALTCHOICE

## 2019-01-10 RX ORDER — FLUCONAZOLE 100 MG/1
150 TABLET ORAL
Status: COMPLETED | OUTPATIENT
Start: 2019-01-10 | End: 2019-01-10

## 2019-01-10 RX ORDER — CYCLOBENZAPRINE HCL 10 MG
10 TABLET ORAL
Qty: 20 TAB | Refills: 0 | Status: SHIPPED | OUTPATIENT
Start: 2019-01-10 | End: 2021-05-03

## 2019-01-10 RX ADMIN — KETOROLAC TROMETHAMINE 30 MG: 30 INJECTION, SOLUTION INTRAMUSCULAR at 11:20

## 2019-01-10 RX ADMIN — INSULIN LISPRO 2 UNITS: 100 INJECTION, SOLUTION INTRAVENOUS; SUBCUTANEOUS at 16:55

## 2019-01-10 RX ADMIN — SODIUM CHLORIDE 125 ML/HR: 900 INJECTION, SOLUTION INTRAVENOUS at 03:22

## 2019-01-10 RX ADMIN — Medication 10 ML: at 06:00

## 2019-01-10 RX ADMIN — ACETAMINOPHEN 1000 MG: 500 TABLET ORAL at 00:29

## 2019-01-10 RX ADMIN — ACETAMINOPHEN 1000 MG: 500 TABLET ORAL at 11:20

## 2019-01-10 RX ADMIN — LEVOTHYROXINE SODIUM 200 MCG: 100 TABLET ORAL at 06:24

## 2019-01-10 RX ADMIN — KETOROLAC TROMETHAMINE 30 MG: 30 INJECTION, SOLUTION INTRAMUSCULAR at 06:24

## 2019-01-10 RX ADMIN — HYDROMORPHONE HYDROCHLORIDE 4 MG: 4 TABLET ORAL at 10:58

## 2019-01-10 RX ADMIN — INSULIN LISPRO 2 UNITS: 100 INJECTION, SOLUTION INTRAVENOUS; SUBCUTANEOUS at 06:38

## 2019-01-10 RX ADMIN — HYDROMORPHONE HYDROCHLORIDE 2 MG: 2 TABLET ORAL at 03:22

## 2019-01-10 RX ADMIN — POLYETHYLENE GLYCOL 3350 17 G: 17 POWDER, FOR SOLUTION ORAL at 08:27

## 2019-01-10 RX ADMIN — ACETAMINOPHEN 1000 MG: 500 TABLET ORAL at 06:24

## 2019-01-10 RX ADMIN — FLUCONAZOLE 150 MG: 100 TABLET ORAL at 14:35

## 2019-01-10 RX ADMIN — CYCLOBENZAPRINE HYDROCHLORIDE 10 MG: 10 TABLET, FILM COATED ORAL at 06:24

## 2019-01-10 RX ADMIN — HYDROMORPHONE HYDROCHLORIDE 4 MG: 4 TABLET ORAL at 06:24

## 2019-01-10 RX ADMIN — HYDROMORPHONE HYDROCHLORIDE 4 MG: 4 TABLET ORAL at 16:55

## 2019-01-10 RX ADMIN — DOCUSATE SODIUM AND SENNOSIDES 1 TABLET: 8.6; 5 TABLET, FILM COATED ORAL at 08:27

## 2019-01-10 RX ADMIN — KETOROLAC TROMETHAMINE 30 MG: 30 INJECTION, SOLUTION INTRAMUSCULAR at 00:29

## 2019-01-10 RX ADMIN — INSULIN LISPRO 2 UNITS: 100 INJECTION, SOLUTION INTRAVENOUS; SUBCUTANEOUS at 11:29

## 2019-01-10 NOTE — PROGRESS NOTES
Primary Nurse Saba Melo RN and Adali Blackman RN performed a dual skin assessment on this patient. x2 dressing on back, left dressing with new drainage. Cold pack applied. Tattoo on upper back. Jovanny score is 20    Called and spoke with MD on call Gume Thompson regarding pt's blood sugar of 190 with 120 units of lantus scheduled HS while on clear liquids. Per MD hold tonight's dose and if sugar is elevated in AM give dose at that time.

## 2019-01-10 NOTE — PROGRESS NOTES
Orthopedic Spine Progress Note  Post Op day: 1 Day Post-Op    January 10, 2019 7:56 AM   Admit Date: 2019  Procedure: Procedure(s):  LATERAL INTERBODY FUSION L3-4 WITH IMAGE GUIDANCE    Subjective:     Oliver Mays doing well. Right lower extremity radicular symptoms resolved. Experiencing pain at surgical site. Well controlled with dilaudid. Does not tolerate oxycodone or morphine due to nausea. Tolerating diet. Eating breakfast this am.  + Flatus. No N/V. Pain Control:   Pain Assessment  Pain Scale 1: Numeric (0 - 10)  Pain Intensity 1: 4  Pain Onset 1: post op  Pain Location 1: Back, Incisional  Pain Orientation 1: Lower  Pain Description 1: Aching, Sore  Pain Intervention(s) 1: Repositioned, Rest, Relaxation technique    Objective:          Physical Exam:  General:  Alert and oriented. No acute distress. Heart:  Respirations unlabored. Abdomen:   Extremities: Soft, non-tender. No evidence of cyanosis. Pulses palpable in both upper and lower extremities. Neurologic:  Musculoskeletal:  No new motor deficits. Neurovascular exam within normal limits. Sensation stable. Motor: unchanged C5-T1 and L2-S1. Kong's sign negative in bilateral lower extremities. Calves soft, nontender upon palpation and with passive twitch. Moves both upper and lower extremities. Incision: clean, dry, and intact. No significant erythema or swelling. No active drainage noted. Vital Signs:    Blood pressure 110/68, pulse 74, temperature 98.8 °F (37.1 °C), resp. rate 18, height 5' 5\" (1.651 m), weight 107.6 kg (237 lb 2 oz), last menstrual period 10/10/2010, SpO2 94 %.   Temp (24hrs), Av.5 °F (36.9 °C), Min:97.5 °F (36.4 °C), Max:99.6 °F (37.6 °C)      LAB:    Recent Labs     01/10/19  0314   HGB 13.1     Lab Results   Component Value Date/Time    Sodium 141 01/10/2019 03:14 AM    Potassium 4.1 01/10/2019 03:14 AM    Chloride 110 (H) 01/10/2019 03:14 AM    CO2 24 01/10/2019 03:14 AM    Glucose 160 (H) 01/10/2019 03:14 AM    BUN 11 01/10/2019 03:14 AM    Creatinine 0.56 01/10/2019 03:14 AM    Calcium 8.1 (L) 01/10/2019 03:14 AM       Intake/Output:No intake/output data recorded. 01/08 1901 - 01/10 0700  In: 1225 [I.V.:1225]  Out: 3300 [Urine:3200]    PT/OT:   Gait:                    Assessment:   Patient is 1 Day Post-Op s/p Procedure(s):  LATERAL INTERBODY FUSION L3-4 WITH IMAGE GUIDANCE    Plan:     1. Continue PT/OT  2. Continue established methods of pain control  3. VTE Prophylaxes - TEDS &/or SCDs   4. Encouraged ICS  5. Discharge pending PT/OT clearance today vs tomorrow  6.        Signed By: LUCIA Dick

## 2019-01-10 NOTE — PROGRESS NOTES
Care Management Interventions  PCP Verified by CM: Yes  Palliative Care Criteria Met (RRAT>21 & CHF Dx)?: No  Transition of Care Consult (CM Consult): [address]: Yes  MyChart Signup: No  Discharge Durable Medical Equipment: No  Health Maintenance Reviewed: Yes  Physical Therapy Consult: Yes  Occupational Therapy Consult: Yes  Speech Therapy Consult: No  Current Support Network: Own Home, Relative's Home  Confirm Follow Up Transport: Family  Plan discussed with Pt/Family/Caregiver: Yes  Freedom of Choice Offered: Yes   Resource Information Provided?: No  Discharge Location  Discharge Placement: Home with home health    Reason for Admission:                      RRAT Score:  2                   Plan for utilizing home health:    Pending Workers Compensation approval              Likelihood of Readmission:  low                         Transition of Care Plan:  Chart reviewed for transitions of care, discussed patient during rounds. Cm met with patient at the bedside to explain role and offer support. Patient is alert and oriented x4, confirmed she typically lives alone but at discharge will be staying with her parents x2 weeks. Therapy is recommending home health and patient is agreeable. Patients' address where she will be going is: [address] [place] [address]. Referral sent to At [address] [place] [address] as [facility] is unable to staff the case. 1:00 pm: Message left for [facility], [phone], to return my call regarding the home health.   Eileen ALEXISW, ACM

## 2019-01-10 NOTE — DISCHARGE INSTRUCTIONS
After Hospital Care Plan:  Discharge Instructions Lumbar Fusion Surgery   Dr. Trent Sullivan   Patient Name: Oliver Mays    Date of procedure: 1/9/2019  Date of discharge: 1/10/2019    Procedure: Procedure(s):  LATERAL INTERBODY FUSION L3-4 WITH IMAGE GUIDANCE  PCP: Shimon Garcia MD    Follow up appointments  -follow up with Dr. Dr. Trent Sullivan in 2 weeks. Call [phone] to make an appointment as soon as you get home from the hospital.    [address] [place] [address]: ____________________   phone: _______________________  The agency will contact you to arrange dates/times for visits. Please call them if you do not hear from them within 24 hours after you are discharged  Physical therapy 3 times a week for 3 weeks  Nursing-initial assessment and as needed    When to call your Orthopaedic Surgeon:  -Signs of infection-if your incision is red; continues to have drainage; drainage has a foul odor or if you have a persistent fever over 101 degrees for 24 hours  -Nausea or vomiting, severe headache  -Loss of bowel or bladder function, inability to urinate  -Changes in sensation in your arms or legs (numbness, tingling, loss of color)  -Increased weakness-greater than before your surgery  -Severe pain or pain not relieved by medications  -Signs of a blood clot in your leg-calf pain, tenderness, redness, swelling of lower leg    When to call your Primary Care Physician:  -Concerns about medical conditions such as diabetes, high blood pressure, asthma, congestive heart failure  -Call if blood sugars are elevated, persistent headache or dizziness, coughing or congestion, constipation or diarrhea, burning with urination, abnormal heart rate    When to call 911 and go to the nearest emergency room:  -Acute onset of chest pain, shortness of breath, difficulty breathing    Activity  -You are going home a well person, be as active as possible. Your only exercise should be walking.   Start with short frequent walks and increase your walking distance each day.  -Limit the amount of time you sit to 20-30 minute intervals. Sitting for prolonged periods of time will be uncomfortable for you following surgery.  -Do NOT lift anything over 5 pounds  -Do NOT do any straining, twisting or bending  -When you are in bed, you may lay on your back or on either side. Do NOT lie on your stomach    Brace  -If you have a back brace, you should wear your brace at all times when you are out of bed. Do not wear the brace while in bed or showering.  -Remember to always wear a cotton t-shirt underneath your brace.  -Do not bend or twist when your brace is off    Diet  -Resume usual diet; drink plenty of fluids; eat foods high in fiber  -It is important to have regular bowel movements. Pain medications may cause constipation. You may want to take a stool softener (such as Senokot-S or Colace) to prevent constipation.   -If constipation occurs, take a laxative (such as Dulcolax tablets, Milk of Magnesia, or a suppository). Laxatives should only be used if the above preventable measures have failed and you still have not had a bowel movement after three days    Driving  -You may not drive or return to work until instructed by your physician. However, you may ride in the car for short periods of time. Incision Care  Your incision has been closed with absorbable sutures and the Zipline skin closure system. This will assist with healing. The Gabbi Donnie is to remain on your incision for 2 weeks. A dry dressing (ABD and tape) will be placed over it and should be changed daily, for at least the first several days after your surgery. If you have no incisional drainage, you may leave the incision open to air if you wish, still leaving the Zipline in place. Please make sure to wash your hands prior to touching your dressing. You may take brief showers but do not run the water directly onto the wound.  After your shower, blot your incision dry with a soft towel and replace the dry dressing. Do not allow the tape to come in contact with the Zipline. Do not rub or apply any lotions or ointments to your incision site. Do not soak or scrub your wound. The Zipline dressing will be removed during your two week follow-up appointment. If you experience drainage leaking from underneath the Zipline or if it peels off before 2 weeks, please contact your orthopedic surgeons office. Showering  -You may shower in approximately 4 days after your surgery.    -Leave the dressing on during your shower. Do NOT allow the water to run directly onto your dressing. Once you get out of the shower, gently pat the dressing dry. -Reminder- your brace can be removed while showering. Remember to not bend or twist while your brace is off.    -Do not take a tub bath. Preventing blood clots  -You have been given T.E.D. stockings to wear. Continue to wear these for 7 days after your discharge. Put them on in the morning and take them off at night.    -They are used to increase your circulation and prevent blood clots from forming in your legs  -T. E.D. stockings can be machine washed, temperature not to exceed 160° F (71°C) and machine dried for 15 to 20 minutes, temperature not to exceed 250° F (121°C). Pain management  -Take pain medication as prescribed; decrease the amount you use as your pain lessens  -DO not wait until you are in extreme pain to take your medication.  -Avoid alcoholic beverages while taking pain medication    Pain Medication Safety  DO:  -Read the Medication Guide   -Take your medicine exactly as prescribed   -Store your medicine away from children and in a safe place   -Flush unused medicine down the toilet   -Call your healthcare provider for medical advice about side effects. You may report side effects to FDA at 1-178-FDA-8957.   -Please be aware that many medications contain Tylenol.   We do not want you to over medicate so please read the information below as a guide. Do not take more than 4 Grams of Tylenol in a 24 hour period. (There are 1000 milligrams in one Gram)                                                                                                                                                                                                                                                Percocet contains 325 mg of Tylenol per tablet (do not take more than 12 tablets in 24 hours)  Lortab contains 500 mg of Tylenol per tablet (do not take more than 8 tablets in 24 hours)  Norco contains 325 mg of Tylenol per tablet (do not take more than 12 tablets in 24 hours). DO NOT:  -Do not give your medicine to others   -Do not take medicine unless it was prescribed for you   -Do not stop taking your medicine without talking to your healthcare provider   -Do not break, chew, crush, dissolve, or inject your medicine. If you cannot swallow your medicine whole, talk to your healthcare provider.  -Do not drink alcohol while taking this medicine  -Do not take anti-inflammatory medications or aspirin unless instructed by your     physician.

## 2019-01-10 NOTE — PROGRESS NOTES
Problem: Mobility Impaired (Adult and Pediatric)  Goal: *Acute Goals and Plan of Care (Insert Text)  Physical Therapy Goals  Initiated 1/10/2019    1. Patient will move from supine to sit and sit to supine  in bed with supervision/set-up within 4 days. 2. Patient will perform sit to stand with supervision/set-up within 4 days. 3. Patient will ambulate with supervision/set-up for 150 feet with the least restrictive device within 4 days. 4. Patient will ascend/descend 6 stairs with 1 handrail(s) with supervision/set-up within 4 days. 5. Patient will verbalize and demonstrate understanding of spinal precautions (No bending, lifting greater than 5 lbs, or twisting; log-roll technique; frequent repositioning as instructed) within 4 days. physical Therapy EVALUATION (delayed entry)  Patient: Daniela Bill (25 y.o. female)  Date: 1/10/2019  Primary Diagnosis: PRIOR FUSION, SPONDYLOLISTHESIS, DDD, CHRONIC LOW BACK PAIN  Spinal stenosis, lumbar  Procedure(s) (LRB):  LATERAL INTERBODY FUSION L3-4 WITH IMAGE GUIDANCE (N/A) 1 Day Post-Op   Precautions: brace,  Fall, Back    ASSESSMENT :  Based on the objective data described below, the patient presents with generally decreased strength, balance, ROM, and overall functional mobility from reported baseline status. Pt seen post RN clearance and was agreeable to bed mobility, transfers, gait, toileting, and OOB to chair for 1 hr goal.  Pt is moving well despite some pain and post op discomfort. VSS and pt in NAD when left with OT in chair. Anticipate return home with HHPT vs no further skilled PT services pending progress (pt has undergone previous back sx). Patient will benefit from skilled intervention to address the above impairments.   Patients rehabilitation potential is considered to be Good  Factors which may influence rehabilitation potential include:   [x]         None noted  []         Mental ability/status  []         Medical condition  [] Home/family situation and support systems  []         Safety awareness  []         Pain tolerance/management  []         Other:      PLAN :  Recommendations and Planned Interventions:  [x]           Bed Mobility Training             []    Neuromuscular Re-Education  [x]           Transfer Training                   []    Orthotic/Prosthetic Training  [x]           Gait Training                         []    Modalities  [x]           Therapeutic Exercises           []    Edema Management/Control  [x]           Therapeutic Activities            [x]    Patient and Family Training/Education  []           Other (comment):    Frequency/Duration: Patient will be followed by physical therapy  twice daily to address goals. Discharge Recommendations: Home Health and None  Further Equipment Recommendations for Discharge: None     SUBJECTIVE:   Patient stated I got an infection last time so I bleached it [TLSO].     OBJECTIVE DATA SUMMARY:   HISTORY:    Past Medical History:   Diagnosis Date    Anxiety 10/2/2017    Arthritis 10/2/2017    Atypical chest pain 10/2/2017    Autoimmune disease ([facility] [address]     FIBROMYLAGIA    Back pain 10/2/2017    Chronic pain     LOWERT BACK, RIGHT LEG    Diabetes ([facility] [address]     IDDM    Diabetes mellitus ([facility] [address] 10/2/2017    Environmental allergies     COLOGNES, CLEANING PRODUCTS, PETROLEUM - BREATHING PROBLEMS    Fatigue 10/2/2017    GERD (gastroesophageal reflux disease)     Hypothyroidism 10/2/2017    Iron deficiency anemia 10/2/2017    Irritable bowel syndrome with constipation 10/2/2017    Migraine     LAST HEADACHE 10-3-13    Nausea & vomiting     Non-allergic rhinitis     Thyroid disease     HYPOTHYROID    TMJ arthralgia 10/2/2017    Ulnar neuropathy at elbow, right 10/2/2017     Past Surgical History:   Procedure Laterality Date    HX APPENDECTOMY  CHILD    HX  SECTION      HX GI      COLONOSCOPY    HX GYN      TUBAL PREGNANCY    HX GYN      EXC BARTHOLIN CYST    HX HEENT      SINUS SURGERY    HX ORTHOPAEDIC      EXC CYST RIGHT WRIST    HX ORTHOPAEDIC      REPAIR FX RIGHT LOWER LEG - TESSA    HX TONSILLECTOMY  CHILD    HX TUBAL LIGATION       Prior Level of Function/Home Situation: indep   Personal factors and/or comorbidities impacting plan of care: supportive family (pt going home to mother's home post op)    Home Situation  Home Environment: Private residence  # Steps to Enter: 6  Rails to Enter: Yes  Hand Rails : Bilateral  One/Two Story Residence: Two story  # of Interior Steps: 12  Interior Rails: Left  Living Alone: No  Support Systems: Family member(s)  Patient Expects to be Discharged to[de-identified] Private residence  Current DME Used/Available at Home: Cane, straight, Shower chair, Safety frame toliet  Tub or Shower Type: Tub/Shower combination    EXAMINATION/PRESENTATION/DECISION MAKING:   Critical Behavior:  Neurologic State: Alert  Orientation Level: Oriented X4  Cognition: Appropriate for age attention/concentration  Safety/Judgement: Insight into deficits  Range Of Motion:  AROM: Generally decreased, functional  PROM: (NT)  Strength:    Strength: Generally decreased, functional  Tone & Sensation:   Tone: Normal  Sensation: Impaired(bilateral hands)  Coordination:  Coordination: Within functional limits  Functional Mobility:  Bed Mobility:  Rolling: Minimum assistance; Additional time  Supine to Sit: Minimum assistance; Additional time  Sit to Supine: (NT; OOB to chair)  Scooting: Contact guard assistance; Additional time  Transfers:  Sit to Stand: Minimum assistance; Additional time  Stand to Sit: Minimum assistance; Additional time(cues for alignment, reach back and controlled desent)  Balance:   Sitting: Intact  Standing: Impaired; Without support  Standing - Static: Fair  Standing - Dynamic : Fair  Ambulation/Gait Training:  Distance (ft): 12 Feet (ft)(x2)  Assistive Device: Gait belt;Brace/Splint(R HHA)  Ambulation - Level of Assistance: Minimal assistance(unilateral A)  Gait Description (WDL): Exceptions to WDL  Gait Abnormalities: Antalgic;Decreased step clearance;Trunk sway increased  Base of Support: Widened  Speed/Carine: Slow;Pace decreased (<100 feet/min)  Step Length: Right shortened;Left shortened  Functional Measure:  Tinetti test:    Sitting Balance: 1  Arises: 1  Attempts to Rise: 2  Immediate Standing Balance: 1  Standing Balance: 1  Nudged: 0  Eyes Closed: 0  Turn 360 Degrees - Continuous/Discontinuous: 0  Turn 360 Degrees - Steady/Unsteady: 1(with UE support)  Sitting Down: 1  Balance Score: 8  Indication of Gait: 1  R Step Length/Height: 1  L Step Length/Height: 1  R Foot Clearance: 1  L Foot Clearance: 1  Step Symmetry: 1  Step Continuity: 0  Path: 1  Trunk: 0  Walking Time: 0  Gait Score: 7  Total Score: 15         Tinetti Tool Score Risk of Falls  <19 = High Fall Risk  19-24 = Moderate Fall Risk  25-28 = Low Fall Risk  Tinetti ME. Performance-Oriented Assessment of Mobility Problems in Elderly Patients. [address] 68; [id number].  (Scoring Description: PT Bulletin Feb. 10, 1993)    Older adults: Annie Rudd et al, 2009; n = 1000 [facility] elderly evaluated with ABC, BARBARA, ADL, and IADL)  · Mean BARBARA score for males aged 69-68 years = 26.21(3.40)  · Mean BARBARA score for females age 69-68 years = 25.16(4.30)  · Mean BARBARA score for males over 80 years = 23.29(6.02)  · Mean BARBARA score for females over 80 years = 17.20(8.32)       Pain:  Pain Scale 1: Numeric (0 - 10)  Pain Intensity 1: 3  Pain Location 1: Back  Pain Orientation 1: Lower  Pain Description 1: Aching  Pain Intervention(s) 1: Medication (see MAR)(using dilaudid and flexeril)  Activity Tolerance:   NAD  After treatment:   [x]         Patient left in no apparent distress sitting up in chair  []         Patient left in no apparent distress in bed  [x]         Call bell left within reach  [x]         Nursing notified  [x]         OT present  []         Bed alarm activated    COMMUNICATION/EDUCATION:   The patients plan of care was discussed with: Registered Nurse. [x]         Fall prevention education was provided and the patient/caregiver indicated understanding. [x]         Patient/family have participated as able in goal setting and plan of care. [x]         Patient/family agree to work toward stated goals and plan of care. []         Patient understands intent and goals of therapy, but is neutral about his/her participation. []         Patient is unable to participate in goal setting and plan of care.     Thank you for this referral.  Jodie Douglass   Time Calculation: 25 mins

## 2019-01-10 NOTE — PROGRESS NOTES
Problem: Mobility Impaired (Adult and Pediatric)  Goal: *Acute Goals and Plan of Care (Insert Text)  Physical Therapy Goals  Initiated 1/10/2019    1. Patient will move from supine to sit and sit to supine  in bed with supervision/set-up within 4 days. 2. Patient will perform sit to stand with supervision/set-up within 4 days. 3. Patient will ambulate with supervision/set-up for 150 feet with the least restrictive device within 4 days. 4. Patient will ascend/descend 6 stairs with 1 handrail(s) with supervision/set-up within 4 days. 5. Patient will verbalize and demonstrate understanding of spinal precautions (No bending, lifting greater than 5 lbs, or twisting; log-roll technique; frequent repositioning as instructed) within 4 days. physical Therapy TREATMENT  Patient: Mariama Hammer (88 y.o. female)  Date: 1/10/2019  Diagnosis: PRIOR FUSION, SPONDYLOLISTHESIS, DDD, CHRONIC LOW BACK PAIN  Spinal stenosis, lumbar <principal problem not specified>  Procedure(s) (LRB):  LATERAL INTERBODY FUSION L3-4 WITH IMAGE GUIDANCE (N/A) 1 Day Post-Op  Precautions: Fall, Back  Chart, physical therapy assessment, plan of care and goals were reviewed. ASSESSMENT:  Pt seen following RN clearance. Pt in bed resting s/p bartholomew removal, but agreeable to participate. Pt demonstrates good bed mobility, transfers, gait, stair training, and back to bed with overall supervision-CGA. Pt is now clear from PT standpoint for return home with family support and HHPT. Discussed brace use with NP/PA (?)-she states pt may wear TLSO for her comfort but no brace is required (Quick draw does not come in XXL as pt would need-or larger). Pt left in NAD in bed s/p session to rest prior to return home. RN reports pt will need to void prior to D/C.   Progression toward goals:  [x]      Improving appropriately and progressing toward goals  []      Improving slowly and progressing toward goals  []      Not making progress toward goals and plan of care will be adjusted     PLAN:  Patient continues to benefit from skilled intervention to address the above impairments. Continue treatment per established plan of care. Discharge Recommendations:  Home Health  Further Equipment Recommendations for Discharge:  NA     SUBJECTIVE:   Patient stated I need to call my mom, they live over an hour away. ..    The patient stated 3/3 back precautions. Reviewed all 3 with patient. OBJECTIVE DATA SUMMARY:   Critical Behavior:  Neurologic State: Alert  Orientation Level: Oriented X4  Cognition: Appropriate for age attention/concentration  Safety/Judgement: Insight into deficits  Functional Mobility Training:  Bed Mobility:  Log Rolling: Contact guard assistance; Additional time  Supine to Sit: Stand-by assistance  Sit to Supine: Stand-by assistance  Scooting: Supervision        Brace donned with  moderate assistance   Transfers:  Sit to Stand: Stand-by assistance  Stand to Sit: Supervision                             Balance:  Sitting: Intact  Standing: Intact  Standing - Static: Good  Standing - Dynamic : Fair(-good)  Ambulation/Gait Training:  Distance (ft): 30 Feet (ft)(x2)  Assistive Device: Gait belt;Brace/Splint  Ambulation - Level of Assistance: Contact guard assistance     Gait Description (WDL): Exceptions to WDL  Gait Abnormalities: Decreased step clearance; Antalgic        Base of Support: Widened     Speed/Carine: Slow  Step Length: Right shortened;Left shortened    Stairs:  Number of Stairs Trained: 5(limited by IV line)  Stairs - Level of Assistance: Contact guard assistance  Rail Use: Left     Pain:  Pain Scale 1: Numeric (0 - 10)  Pain Intensity 1: 2  Pain Location 1: Back  Pain Orientation 1: Lower  Pain Description 1: Aching  Pain Intervention(s) 1: Medication (see MAR)  Activity Tolerance:   NAD  After treatment:   []  Patient left in no apparent distress sitting up in chair  [x]  Patient left in no apparent distress in bed  [x]  Call bell left within reach  [x]  Nursing notified  []  Caregiver present  []  Bed alarm activated    COMMUNICATION/COLLABORATION:   The patients plan of care was discussed with: Registered Nurse    Henry Sanchez   Time Calculation: 37 mins

## 2019-01-10 NOTE — PROGRESS NOTES
Discharge instructions covered with patient, mother and daughter. All state full understanding with no questions at this time. Patient provided with dressings, tape, and teds for home use. Dressing change demonstrated for mother and daughter at the bedside- both state full understanding.

## 2019-01-10 NOTE — DIABETES MGMT
[facility] Post Surgical Education Note    Chart reviewed and initial evaluation complete on [address]. Current hospital DM medication:  Lantus 120 units HS (held last night) and Humalog for correction, normal sensitivity scale     Assessed and instructed patient on the following interpretation of lab results, blood sugar goals, hypoglycemia prevention and treatment, SMBG skills and nutrition, risk of infection/ delayed healing. Pt reports to take all DM medications daily. Hypoglycemia rare and does experience symptoms of hypoglycemia when BG around 70 mg/dl. Eats 3 meals a day and counts carbohydrate, reports to try to eat 45 grams with breakfast, lunch and dinner and less than 15 grams with snacks. Provided patient with the following:        [x]        Survival skills education materials       [x]       Post surgery BG management guidelines                                                   [x]        Outpatient D[facility] contact number                  Patient is a 66 y.o. female with hx Type 2 Diabetes on Tresiba U-200 120 units HS, Humalog 20-25 units AC TID and Trulicity 1.5 mg every 7 days at home. A1c:   POC Glucose last 24hrs:   Lab Results   Component Value Date/Time    Glucose (POC) 170 (H) 01/10/2019 11:22 AM    Glucose (POC) 143 (H) 01/10/2019 06:00 AM    Glucose (POC) 190 (H) 01/09/2019 09:22 PM    Glucose (POC) 198 (H) 01/09/2019 04:21 PM    Glucose (POC) 169 (H) 01/09/2019 02:53 PM       Recent Glucose Results:   Lab Results   Component Value Date/Time     (H) 01/10/2019 03:14 AM    GLUCPOC 170 (H) 01/10/2019 11:22 AM    GLUCPOC 143 (H) 01/10/2019 06:00 AM    GLUCPOC 190 (H) 01/09/2019 09:22 PM        Lab Results   Component Value Date/Time    Creatinine 0.56 01/10/2019 03:14 AM     Estimated Creatinine Clearance: 136.7 mL/min (based on SCr of 0.56 mg/dL). Active Orders   Diet    DIET DIABETIC CONSISTENT CARB Regular        PO intake: No data found.     Will continue to follow as needed. Thank you.   Kathy Frank RD, E  [facility]  Pager: [phone]   Time spent: 15  minutes

## 2019-01-10 NOTE — PROGRESS NOTES
Occupational Therapy EVALUATION/discharge  Patient: Mariama Hammer (48 y.o. female)  Date: 1/10/2019  Primary Diagnosis: PRIOR FUSION, SPONDYLOLISTHESIS, DDD, CHRONIC LOW BACK PAIN  Spinal stenosis, lumbar  Procedure(s) (LRB):  LATERAL INTERBODY FUSION L3-4 WITH IMAGE GUIDANCE (N/A) 1 Day Post-Op   Precautions: Quickdraw brace when OOB,  Fall, Back    ASSESSMENT:   Based on the objective data described below, the patient presents with largely MOD I-SBA for ADL transfers and tasks s/p spinal precautions with newly acquired spinal precautions, limited BLE dexterity, and slightly impaired dynamic standing balance. PTA, patient was largely IND for ADL transfers and tasks. Patient has had prior back sx and verbalized knowledge of 3/3 spinal precautions and knowledge of AE for LB ADLs. Patient demonstrated toilet transfer and functional mobility x SBA. Patient reports she plans to live at her mother's house for a couple of weeks after surgery and her daughter will be around to assist her as needed. OT provided educational handouts on home safety after spinal sx and HEP. Patient safe to discharge home with family with no further OT needs at this time. Further skilled acute occupational therapy is not indicated at this time. Discharge Recommendations: None  Further Equipment Recommendations for Discharge: tub transfer bench, reacher, sockaid, LH sponge (given handout on resources to obtain)      SUBJECTIVE:   Patient stated I have been through this before.     OBJECTIVE DATA SUMMARY:   HISTORY:   Past Medical History:   Diagnosis Date    Anxiety 10/2/2017    Arthritis 10/2/2017    Atypical chest pain 10/2/2017    Autoimmune disease ([address]     [place]    Back pain 10/2/2017    Chronic pain     LOWERT BACK, RIGHT LEG    Diabetes ([address]     IDDM    Diabetes mellitus ([address] 10/2/2017    Environmental allergies     COLOGNES, CLEANING PRODUCTS, PETROLEUM - BREATHING PROBLEMS    Fatigue 10/2/2017    GERD (gastroesophageal reflux disease)     Hypothyroidism 10/2/2017    Iron deficiency anemia 10/2/2017    Irritable bowel syndrome with constipation 10/2/2017    Migraine     LAST HEADACHE 10-3-13    Nausea & vomiting     Non-allergic rhinitis     Thyroid disease     HYPOTHYROID    TMJ arthralgia 10/2/2017    Ulnar neuropathy at elbow, right 10/2/2017     Past Surgical History:   Procedure Laterality Date    HX APPENDECTOMY  CHILD    HX  SECTION      HX GI  2012    COLONOSCOPY    HX GYN      TUBAL PREGNANCY    HX GYN      EXC BARTHOLIN CYST    HX HEENT      SINUS SURGERY    HX ORTHOPAEDIC      EXC CYST RIGHT WRIST    HX ORTHOPAEDIC      REPAIR FX RIGHT LOWER LEG - TESSA    HX TONSILLECTOMY  CHILD    HX TUBAL LIGATION         Prior Level of Function/Environment/Context: PTA, patient was IND for ADL and IADL tasks.     Expanded or extensive additional review of patient history:     Home Situation  Home Environment: Private residence  # Steps to Enter: 6  Rails to Enter: Yes  Hand Rails : Bilateral  One/Two Story Residence: Two story  # of Interior Steps: 12  Interior Rails: Left  Living Alone: No  Support Systems: Family member(s)  Patient Expects to be Discharged to[de-identified] Private residence  Current DME Used/Available at Home: Cane, straight, Shower chair, Safety frame [employer]  Tub or Shower Type: Tub/Shower combination    Hand dominance: Right    EXAMINATION OF PERFORMANCE DEFICITS:  Cognitive/Behavioral Status:  Neurologic State: Alert  Orientation Level: Oriented X4  Cognition: Appropriate for age attention/concentration  Perception: Appears intact  Perseveration: No perseveration noted  Safety/Judgement: Insight into deficits    Skin: exposed areas grossly intact; bartholomew; IV in RUE; bandage intact    Edema: none noted                        Range of Motion:  BUE  AROM: Generally decreased, functional  PROM: Generally decreased, functional                      Strength:  BUE  Strength: Generally decreased, functional                Coordination:  Coordination: Generally decreased, functional  Fine Motor Skills-Upper: Left Intact; Right Intact    Gross Motor Skills-Upper: Left Intact; Right Intact    Tone & Sensation:  BUE  Tone: Normal  Sensation: Impaired(bilateral hands)                      Balance:  Sitting: Intact; Without support  Standing: Impaired; Without support  Standing - Static: Fair  Standing - Dynamic : Fair    Functional Mobility and Transfers for ADLs:    Transfers:  Sit to Stand: Contact guard assistance;Assist x1  Stand to Sit: Assist x1;Additional time;Stand-by assistance  Toilet Transfer : Stand-by assistance;Assist x1;Additional time    ADL Assessment:  Feeding: Independent(infer per patient report)    Oral Facial Hygiene/Grooming: Modified Independent(infer increased time standing at the sink)    Bathing: Supervision(infer 2* spinal precautions)    Upper Body Dressing: Modified independent(infer increased time for item retrieval)    Lower Body Dressing: Modified independent(using AE (patient reports knowledge))    Toileting: Modified independent(infer increased time 2* spinal precautions)                ADL Intervention and task modifications:                             Cognitive Retraining  Safety/Judgement: Insight into deficits  Bathing: Patient instructed and indicated understanding when bathing to not submerge wound in water, stand to shower or sponge bathe, cover wound with plastic and tape to ensure no water reaches bandage/wound without cues. Dressing brace: Patient instructed and demonstrated to don/doff velcro on brace using dominant side, keeping non-dominant side intact. Patient instructed and demonstrated in meantime of being able to stand with back against wall to don/doff brace, to don/doff seated using lap and bed/chair surface to support brace while manipulating.   Dressing lower body: Patient instructed to don brace first and on the benefits to remain seated to don all clothing to increase independence with precautions and pain management. Patient verbalized knowledge of sockaid. Toileting: Patient instructed on the benefits of using flushable wet wipes and toilet tongs if decreased reach or pain for essence care. Also, the benefits of a reacher to aid in clothing management. Home safety: Patient instructed and indicated understanding on home modifications and safety (raise height of ADL objects, appropriate height of chair surfaces, recliner safety, change of floor surfaces, clear pathways) to increase independence and fall prevention. Tub transfer: Patient instructed and indicated understanding regarding when it is safe to begin transfer into tub (complete stairs with PT, advance exercises with PT high enough to clear tub height, and while clothes donned practice with another person present). Patient educated on tub transfer bench for safety. Patient instructed and indicated understanding the benefits of maintaining activity tolerance, functional mobility, and independence with self care tasks during acute stay  to ensure safe return home and to baseline. Encouraged patient to increase frequency and duration OOB, not sitting longer than 30 mins without marching/walking with staff, be out of bed for all meals, perform daily ADLs (as approved by RN/MD regarding bathing etc), and performing functional mobility to/from bathroom. Patient instruction and indicated understanding on body mechanics, ergonomics and gravitational force on the spine during different body positions to plan activities in prep for return home to complete instrumental ADLs and back to work safely. Patient instructed and demonstrated while supine hip ER stretch and hold 10 seconds to increase ROM in prep for lower body ADLs daily. Therapeutic Exercises:   Patient instructed on the benefits shoulder exercises to increase independence with ADLs, active ROM, and strength of spine.   Patient instructed and demonstrated techniques of activating abdomen muscles. Patient instructed and indicated understanding how to progress reps and sets against gravity to then working up to 5 lbs, until surgeon clears, in which can use household items for weights. Cognitive Retraining  Safety/Judgement: Insight into deficits      Functional Measure:  Barthel Index:    Bathin  Bladder: 0  Bowels: 10  Groomin  Dressing: 10  Feeding: 10  Mobility: 10  Stairs: 5  Toilet Use: 10  Transfer (Bed to Chair and Back): 10  Total: 70        The Barthel ADL Index: Guidelines  1. The index should be used as a record of what a patient does, not as a record of what a patient could do. 2. The main aim is to establish degree of independence from any help, physical or verbal, however minor and for whatever reason. 3. The need for supervision renders the patient not independent. 4. A patient's performance should be established using the best available evidence. Asking the patient, friends/relatives and nurses are the usual sources, but direct observation and common sense are also important. However direct testing is not needed. 5. Usually the patient's performance over the preceding 24-48 hours is important, but occasionally longer periods will be relevant. 6. Middle categories imply that the patient supplies over 50 per cent of the effort. 7. Use of aids to be independent is allowed. Radha Lundberg., Barthel, D.W. ([id number]). Functional evaluation: the Barthel Index. [address] [facility] (14)2. MG GutiérrezF, Ivonne Hurley., Charis Apley., [place], [address] [place] [address] (). Measuring the change indisability after inpatient rehabilitation; comparison of the responsiveness of the Barthel Index and Functional [place] Measure. Journal of Neurology, Neurosurgery, and Psychiatry, 66(4), 562-722.   Leland Lennox, N.J.A, Megan Rosales,  PREM.J.M, & Cali Larsen M.A. (2004.) Assessment of post-stroke quality of life in cost-effectiveness studies: The usefulness of the Barthel Index and the EuroQoL-5D. Quality of Life Research, 15, 101-23     Occupational Therapy Evaluation Charge Determination   History Examination Decision-Making   LOW Complexity : Brief history review  MEDIUM Complexity : 3-5 performance deficits relating to physical, cognitive , or psychosocial skils that result in activity limitations and / or participation restrictions MEDIUM Complexity : Patient may present with comorbidities that affect occupational performnce. Miniml to moderate modification of tasks or assistance (eg, physical or verbal ) with assesment(s) is necessary to enable patient to complete evaluation       Based on the above components, the patient evaluation is determined to be of the following complexity level: LOW   Pain:  Pain Scale 1: Numeric (0 - 10)  Pain Intensity 1: 4  Pain Location 1: Back; Incisional  Pain Orientation 1: Lower  Pain Description 1: Aching; Sore  Pain Intervention(s) 1: Repositioned;Rest;Relaxation technique  Activity Tolerance:   VSS  Please refer to the flowsheet for vital signs taken during this treatment. After treatment:   [x]  Patient left in no apparent distress sitting up in chair  []  Patient left in no apparent distress in bed  [x]  Call bell left within reach  [x]  Nursing notified  []  Caregiver present  []  Bed alarm activated    COMMUNICATION/EDUCATION:   Communication/Collaboration:  [x]      Home safety education was provided and the patient/caregiver indicated understanding. [x]      Patient/family have participated as able and agree with findings and recommendations. []      Patient is unable to participate in plan of care at this time.   Findings and recommendations were discussed with: Physical Therapist and Registered Nurse    Keith Schaeffer  Time Calculation: 36 mins

## 2019-01-10 NOTE — PROGRESS NOTES
Cm contacted [facility] to discuss home health and if worker's comp has reached out to them. [facility] will attempt to contact agency, waiting for response. 3:35 p.m. Spoke to Saúl Daugherty, [address] [place] [address] Street [phone], she is referring the case to [facility]. [facility]. should contact me to setup home health. Cm to follow. 4:58 p.m.: Cm notified of patient discharge this afternoon. Patient was not ready to discharge due to home health not being setup due to waiting response from worker's comp. Cm [facility] Litzy Han[facility], per Saúl Daugherty, referral was sent to [facility] to contact cm about contracted agency. CM contacted [facility], due to patient being ready for discharge. Cm faxed [place] [phone]. paperwork to schedule home health visit, [phone]. [place] [phone]. contacted [facility], and they will start care on 1/11/19.        Daksha Lowe [facility]

## 2019-01-10 NOTE — PROGRESS NOTES
Evaluation completed and full note to follow. Pt moving well and likely appropriate for D/C home following PM therapy session with HHPT and family support.   Thank you,  Amber Moe, PT, DPT

## 2019-01-14 NOTE — DISCHARGE SUMMARY
Procedure(s):  LATERAL INTERBODY FUSION L3-4 WITH IMAGE GUIDANCE Operative Report      Date of Surgery: 1/9/2019     Preoperative Diagnosis:  PRIOR FUSION, SPONDYLOLISTHESIS, DDD, CHRONIC LOW BACK PAIN    Postoperative Diagnosis: PRIOR FUSION, SPONDYLOLISTHESIS, DDD, CHRONIC LOW BACK PAIN    Procedure: Procedure(s):  LATERAL INTERBODY FUSION L3-4 WITH IMAGE GUIDANCE     Surgeon: Aleksandr Gagnon MD    History and Hospital Course:  Alma Mahan is a pleasant 66y.o. year old female who has complaints of low back pain with neurogenic claudication. Diagnostic testing found spondylolisthesis and stenosis. Having failed conservative treatment, the patient elected to undergo operative intervention. She tolerated the procedure well and was admitted post-operatively for antibiotics and pain control. On post-op day 1, the patient was doing well. She had some complaints of lower back pain but pain medication controlled pain. She was started on a clear liquid diet. She was allowed to dangle on the edge of the bed with physical therapy. PCA was discontinued and oral pain medications were started. IV antibiotics were continued. On post-op day 1, the patient continued to progress well. She was started on a regular diet. She was allowed to started getting out of bed with physical therapy. She progressed well with physical therapy. The patient was deemed ready for discharge. She was discharged to home with family assistance. She was tolerating a regular diet and pain was well controlled with oral pain medications. The patient was discharged with prescriptions for pain medications. She was instructed to wear her brace at all times when out of bed. She was instructed to limit her bending, lifting and twisting. The patient will follow-up in 10-14 days for repeat x-rays and a wound check.       Signed By: Aleksandr Gagnon MD

## 2019-02-15 RX ORDER — FLUCONAZOLE 150 MG/1
150 TABLET ORAL DAILY
Qty: 1 TAB | Refills: 0 | Status: SHIPPED | OUTPATIENT
Start: 2019-02-15 | End: 2019-02-16

## 2019-03-13 ENCOUNTER — OFFICE VISIT (OUTPATIENT)
Dept: INTERNAL MEDICINE CLINIC | Age: 59
End: 2019-03-13

## 2019-03-13 VITALS
HEART RATE: 93 BPM | HEIGHT: 65 IN | OXYGEN SATURATION: 98 % | WEIGHT: 238 LBS | SYSTOLIC BLOOD PRESSURE: 112 MMHG | DIASTOLIC BLOOD PRESSURE: 69 MMHG | TEMPERATURE: 98.5 F | BODY MASS INDEX: 39.65 KG/M2

## 2019-03-13 DIAGNOSIS — R60.0 BILATERAL LOWER EXTREMITY EDEMA: Primary | ICD-10-CM

## 2019-03-13 RX ORDER — HYDROCHLOROTHIAZIDE 25 MG/1
25 TABLET ORAL DAILY
Qty: 30 TAB | Refills: 0 | Status: SHIPPED | OUTPATIENT
Start: 2019-03-13 | End: 2019-04-08 | Stop reason: SDUPTHER

## 2019-03-13 RX ORDER — TRAMADOL HYDROCHLORIDE 50 MG/1
50 TABLET ORAL
COMMUNITY
End: 2020-02-27

## 2019-03-13 NOTE — PROGRESS NOTES
Kenton Marc  Identified pt with two pt identifiers(name and ). Chief Complaint   Patient presents with    Ankle swelling     bilateral    Foot Swelling     bilateral    Leg Swelling     bilateral   Patient of Dr Maricruz Morse with complaint of bilateral lower extremity swelling since having back surgery 19 per Dr Amanda Sanchez.    1. Have you been to the ER, urgent care clinic since your last visit? Hospitalized since your last visit? Yes, [facility], Orthopaedic surgery    2. Have you seen or consulted any other health care providers outside of the [facility] since your last visit? Include any pap smears or colon screening. Yes, Orthopaedics      Medication reconciliation up to date and corrected with patient at this time. Today's provider has been notified of reason for visit, vitals and flowsheets obtained on patients. Reviewed record in preparation for visit, huddled with provider and have obtained necessary documentation. Depression Risk Factor Screening:     3 most recent PHQ Screens 10/2/2017   Little interest or pleasure in doing things Not at all   Feeling down, depressed, irritable, or hopeless Not at all   Total Score PHQ 2 0       Functional Ability and Level of Safety:     Activities of Daily Living  No flowsheet data found. Fall Risk  No flowsheet data found. Abuse Screen  No flowsheet data found.         Health Maintenance Due   Topic    Hepatitis C Screening     LIPID PANEL Q1     FOOT EXAM Q1     MICROALBUMIN Q1     Pneumococcal 19-64 Medium Risk (1 of 1 - PPSV23)    DTaP/Tdap/Td series (1 - Tdap)    PAP AKA CERVICAL CYTOLOGY     Shingrix Vaccine Age 50> (1 of 2)    BREAST CANCER SCRN MAMMOGRAM     FOBT Q 1 YEAR AGE 54-65     Influenza Age 5 to Adult        Wt Readings from Last 3 Encounters:   19 238 lb (108 kg)   19 237 lb 2 oz (107.6 kg)   18 237 lb 2 oz (107.6 kg)     Temp Readings from Last 3 Encounters:   19 98.5 °F (36.9 °C) (Oral)   01/10/19 98.6 °F (37 °C)   12/19/18 97.9 °F (36.6 °C)     BP Readings from Last 3 Encounters:   03/13/19 112/69   01/10/19 103/60   12/19/18 99/66     Pulse Readings from Last 3 Encounters:   03/13/19 93   01/10/19 77   12/19/18 74     Vitals:    03/13/19 1150   BP: 112/69   Pulse: 93   Temp: 98.5 °F (36.9 °C)   TempSrc: Oral   SpO2: 95%   Weight: 238 lb (108 kg)   Height: 5' 5\" (1.651 m)   LMP: 10/10/2010         Patient Care Team   Patient Care Team:  Fernanda Love MD as PCP - General (Internal Medicine)

## 2019-03-13 NOTE — LETTER
NOTIFICATION RETURN TO WORK / SCHOOL 
 
3/13/2019 12:18 PM 
 
Ms. [address] [facility] [address] To Whom It May Concern: 
 
Angel Khanggaro is currently under the care of [facility]. Ms Karin Khalil needs to be able to leave her desk for 5 minutes every hour to walk for the next 2 weeks. She will return to work/school on: 03/13/2019 If there are questions or concerns please contact our office.  
 
 
 
Sincerely, 
 
 
Foreign Shi NP

## 2019-03-13 NOTE — PROGRESS NOTES
Subjective:  Ms. Veronica Dumont is a pleasant 66year old lady, patient of Dr. Andrea Walters, who comes in today for evaluation of bilateral lower leg edema. Further discussion with her revealed that she underwent an anterolateral fusion L3-L4 by  Dr. Ponce Guzman 19. She did well postoperatively and she returned to work three weeks ago. She was working half days for two days. Last Friday she started noticing some swelling in both lower extremities. Now that she is back to full time it has gotten somewhat worse. It does get better after she sits at home and keeps her leg elevated. She denies any calf pain. She denies any chest pain or palpitations. She denies any shortness of breath, cough, wheezing, PND or orthopnea. Denies any chills or fever. Her appetite is good and she denies excessive use of salt.     Past Medical History:   Diagnosis Date    Anxiety 10/2/2017    Arthritis 10/2/2017    Atypical chest pain 10/2/2017    Autoimmune disease ([address]     FIBROMYLAGIA    Back pain 10/2/2017    Chronic pain     LOWERT BACK, RIGHT LEG    Diabetes ([address]     IDDM    Diabetes mellitus ([address] 10/2/2017    Environmental allergies     COLOGNES, CLEANING PRODUCTS, PETROLEUM - BREATHING PROBLEMS    Fatigue 10/2/2017    GERD (gastroesophageal reflux disease)     Hypothyroidism 10/2/2017    Iron deficiency anemia 10/2/2017    Irritable bowel syndrome with constipation 10/2/2017    Migraine     LAST HEADACHE 10-3-13    Nausea & vomiting     Non-allergic rhinitis     Thyroid disease     HYPOTHYROID    TMJ arthralgia 10/2/2017    Ulnar neuropathy at elbow, right 10/2/2017     Past Surgical History:   Procedure Laterality Date    HX APPENDECTOMY  CHILD    HX  SECTION      HX GI  2012    COLONOSCOPY    HX GYN      TUBAL PREGNANCY    HX GYN      EXC BARTHOLIN CYST    HX HEENT      SINUS SURGERY    HX ORTHOPAEDIC      EXC CYST RIGHT WRIST    HX ORTHOPAEDIC      REPAIR FX RIGHT LOWER LEG - TESSA    HX TONSILLECTOMY  CHILD    HX TUBAL LIGATION         Current Outpatient Medications on File Prior to Visit   Medication Sig Dispense Refill    traMADol (ULTRAM) 50 mg tablet Take 50 mg by mouth every four to six (4-6) hours as needed for Pain.  cyclobenzaprine (FLEXERIL) 10 mg tablet Take 1 Tab by mouth three (3) times daily as needed for Muscle Spasm(s). 20 Tab 0    insulin lispro (HUMALOG) 100 unit/mL injection by SubCUTAneous route. 20-25 Units before breakfast, lunch and dinner      INSULIN DEGLUDEC (TRESIBA FLEXTOUCH U-200 SC) 120 Units by SubCUTAneous route nightly.  LORazepam (ATIVAN) 1 mg tablet Take  by mouth every four (4) hours as needed for Anxiety.  dulaglutide (TRULICITY) 1.5 HA/7.4 mL sub-q pen 1.5 mg by SubCUTAneous route every seven (7) days. TAKES EVERY FRIDAY      levothyroxine (SYNTHROID) 175 mcg tablet Take 200 mcg by mouth Daily (before breakfast).  lisinopril (PRINIVIL, ZESTRIL) 10 mg tablet Take 10 mg by mouth every evening.  HYDROmorphone (DILAUDID) 2 mg tablet Take 1 Tab by mouth every four (4) hours as needed for Pain. Max Daily Amount: 12 mg. 60 Tab 0    naloxone (NARCAN) 4 mg/actuation nasal spray Use 1 spray intranasally, then discard. Repeat with new spray every 2 min as needed for opioid overdose symptoms, alternating nostrils. 2 Each 0     No current facility-administered medications on file prior to visit. Allergies   Allergen Reactions    Adhesive Rash    Augmentin [Amoxicillin-Pot Clavulanate] Hives    Codeine Nausea and Vomiting    Crestor [Rosuvastatin] Hives    Dilaudid [Hydromorphone] Hives    Levaquin [Levofloxacin] Unknown (comments)    Morphine Hives    Oxycodone Itching    Penicillins Hives    Sulfa (Sulfonamide Antibiotics) Hives   Physical Examination:  GENERAL:  Pleasant lady in no acute distress. She is alert and oriented. VITALS:  BP: 112/69. P: 93.  T: 98.5. O2 sat: initially 95, repeated by myself 98%.   HEENT: Normocephalic, atraumatic. NECK:  Supple without adenopathy. CHEST:  Lungs clear to auscultation, no rales or wheezes. Good chest excursion. CV:  Heart regular rhythm without murmur or gallop. EXTREMITIES: 1+ pitting edema in both ankles. Trace pretibial edema. She had excellent pulses in both feet. No calf tenderness. Adan Wilmer' sign negative bilaterally. She had good sensation in her feet. There are no open lesions. Impression:  1. Bilateral lower extremity edema. 2. Status post lumbar spinal fusion. 3. Hypertension. 4. Diabetes mellitus type 2. Plan:  1. It was opted to start her on Hydrochlorothiazide 12.5 mg, one tablet daily for fluid retention. 2. My recommendation is that she needs to be up at least five minutes every hour on the hour to walk around and see if we can help reduce some of that swelling. 3. I suggested some elastic stockings, however because of her recent back surgery she is unable to wear them. 4. She is to keep her leg elevated when she is at home and sitting around. 5. She certainly will contact us should she fail to improve or if her condition worsens.

## 2019-03-27 ENCOUNTER — TELEPHONE (OUTPATIENT)
Dept: INTERNAL MEDICINE CLINIC | Age: 59
End: 2019-03-27

## 2019-03-27 NOTE — TELEPHONE ENCOUNTER
Patient states she has a migraine and needs a refill of her Imitrex sent to [facility] in [place]. Last time she had this prescription filled was Jan 2016.

## 2019-03-29 RX ORDER — SUMATRIPTAN 100 MG/1
100 TABLET, FILM COATED ORAL
Qty: 9 TAB | Refills: 2 | Status: SHIPPED | OUTPATIENT
Start: 2019-03-29 | End: 2021-03-29

## 2019-04-08 RX ORDER — HYDROCHLOROTHIAZIDE 25 MG/1
25 TABLET ORAL DAILY
Qty: 30 TAB | Refills: 6 | Status: SHIPPED | OUTPATIENT
Start: 2019-04-08 | End: 2019-04-26 | Stop reason: ALTCHOICE

## 2019-04-08 RX ORDER — HYDROCHLOROTHIAZIDE 25 MG/1
TABLET ORAL
Qty: 30 TAB | Refills: 0 | OUTPATIENT
Start: 2019-04-08

## 2019-04-26 ENCOUNTER — OFFICE VISIT (OUTPATIENT)
Dept: INTERNAL MEDICINE CLINIC | Age: 59
End: 2019-04-26

## 2019-04-26 VITALS
OXYGEN SATURATION: 98 % | BODY MASS INDEX: 39.85 KG/M2 | TEMPERATURE: 98.1 F | SYSTOLIC BLOOD PRESSURE: 122 MMHG | HEIGHT: 65 IN | HEART RATE: 74 BPM | WEIGHT: 239.2 LBS | DIASTOLIC BLOOD PRESSURE: 82 MMHG

## 2019-04-26 DIAGNOSIS — F41.9 ANXIETY: Primary | ICD-10-CM

## 2019-04-26 RX ORDER — FUROSEMIDE 20 MG/1
20 TABLET ORAL DAILY
Qty: 30 TAB | Refills: 1 | Status: SHIPPED | OUTPATIENT
Start: 2019-04-26 | End: 2019-06-18 | Stop reason: SDUPTHER

## 2019-04-26 RX ORDER — CLONAZEPAM 0.5 MG/1
0.5 TABLET ORAL
Qty: 15 TAB | Refills: 0 | Status: SHIPPED | OUTPATIENT
Start: 2019-04-26 | End: 2020-02-27

## 2019-04-26 RX ORDER — SERTRALINE HYDROCHLORIDE 50 MG/1
50 TABLET, FILM COATED ORAL DAILY
Qty: 30 TAB | Refills: 1 | Status: SHIPPED | OUTPATIENT
Start: 2019-04-26 | End: 2019-05-13 | Stop reason: SINTOL

## 2019-04-26 NOTE — PROGRESS NOTES
This note will not be viewable in [address] Marleny Chen is a 64 y.o. female and presents with Depression  . Subjective:    Mrs. Tatum Larose presents today for depression and anxiety. She is been very upset and tearful at work. She has had trouble concentrating on her job. She has multiple things going on. Her mother had an angioplasty done last week. Her baby sister was recently diagnosed with metastatic cancer to her brain and is at home on hospice. Her daughter just moved out. She found out that she may be losing her job. She just feels like everything is out of control and she is having difficulty managing her emotions. She also complains of lower extremity edema. She was placed on hydrochlorthiazide for this does not feel that it has been effective for her. She had lab work done today and has follow-up with her endocrinologist next week. Review of Systems  Constitutional:   Eyes:   negative for visual disturbance and irritation  ENT:   negative for tinnitus,sore throat,nasal congestion,ear pains. hoarseness  Respiratory:  negative for cough, hemoptysis, dyspnea,wheezing  CV:   negative for chest pain, palpitations  GI:   negative for nausea, vomiting, diarrhea, abdominal pain,melena  Endo:               negative for polyuria,polydipsia,polyphagia,heat intolerance  Genitourinary: negative for frequency, dysuria and hematuria  Integumentary: negative for rash and pruritus  Hematologic:  negative for easy bruising and gum/nose bleeding  Musculoskel: negative for myalgias, arthralgias, back pain, muscle weakness, joint pain  Neurological:  negative for headaches, dizziness, vertigo, memory problems and gait   Behavl/Psych: Positive for feelings of anxiety, depression, mood changes    Past Medical History:   Diagnosis Date    Anxiety 10/2/2017    Arthritis 10/2/2017    Atypical chest pain 10/2/2017    Autoimmune disease ([facility] [address]     FIBROMYLAGIA    Back pain 10/2/2017    Chronic pain     LOWERT BACK, RIGHT LEG    Diabetes ([facility] [address]     IDDM    Diabetes mellitus ([facility] [address] 10/2/2017    Environmental allergies     COLOGNES, CLEANING PRODUCTS, PETROLEUM - BREATHING PROBLEMS    Fatigue 10/2/2017    GERD (gastroesophageal reflux disease)     Hypothyroidism 10/2/2017    Iron deficiency anemia 10/2/2017    Irritable bowel syndrome with constipation 10/2/2017    Migraine     LAST HEADACHE 10-3-13    Nausea & vomiting     Non-allergic rhinitis     Thyroid disease     HYPOTHYROID    TMJ arthralgia 10/2/2017    Ulnar neuropathy at elbow, right 10/2/2017     Past Surgical History:   Procedure Laterality Date    HX APPENDECTOMY  CHILD    HX  SECTION      HX GI  2012    COLONOSCOPY    HX GYN      TUBAL PREGNANCY    HX GYN      EXC BARTHOLIN CYST    HX HEENT      SINUS SURGERY    HX ORTHOPAEDIC      EXC CYST RIGHT WRIST    HX ORTHOPAEDIC      REPAIR FX RIGHT LOWER LEG - TESSA    HX TONSILLECTOMY  CHILD    HX TUBAL LIGATION       Social History     Socioeconomic History    Marital status:      Spouse name: Not on file    Number of children: Not on file    Years of education: Not on file    Highest education level: Not on file   Tobacco Use    Smoking status: Former Smoker     Last attempt to quit: 10/10/1990     Years since quittin.5    Smokeless tobacco: Never Used   Substance and Sexual Activity    Alcohol use: Yes     Comment: 4 DRINKS PER MONTH    Drug use: No     Family History   Problem Relation Age of Onset    Post-op Nausea/Vomiting Mother     Asthma Mother     COPD Mother     Heart Attack Mother     Diabetes Father     Heart Disease Father     Cancer Sister         BREAST    Arthritis-osteo Sister     Arthritis-rheumatoid Sister     No Known Problems Daughter      Current Outpatient Medications   Medication Sig Dispense Refill    furosemide (LASIX) 20 mg tablet Take 1 Tab by mouth daily. 30 Tab 1    sertraline (ZOLOFT) 50 mg tablet Take 1 Tab by mouth daily. 30 Tab 1    clonazePAM (KLONOPIN) 0.5 mg tablet Take 1 Tab by mouth daily as needed (anxiety). 15 Tab 0    traMADol (ULTRAM) 50 mg tablet Take 50 mg by mouth every four to six (4-6) hours as needed for Pain.  naloxone (NARCAN) 4 mg/actuation nasal spray Use 1 spray intranasally, then discard. Repeat with new spray every 2 min as needed for opioid overdose symptoms, alternating nostrils. 2 Each 0    insulin lispro (HUMALOG) 100 unit/mL injection by SubCUTAneous route. 20-25 Units before breakfast, lunch and dinner      INSULIN DEGLUDEC (TRESIBA FLEXTOUCH U-200 SC) 120 Units by SubCUTAneous route nightly.  LORazepam (ATIVAN) 1 mg tablet Take  by mouth every four (4) hours as needed for Anxiety.  dulaglutide (TRULICITY) 1.5 UN/5.7 mL sub-q pen 1.5 mg by SubCUTAneous route every seven (7) days. TAKES EVERY FRIDAY      levothyroxine (SYNTHROID) 175 mcg tablet Take 200 mcg by mouth Daily (before breakfast).  lisinopril (PRINIVIL, ZESTRIL) 10 mg tablet Take 10 mg by mouth every evening.  HYDROmorphone (DILAUDID) 2 mg tablet Take 1 Tab by mouth every four (4) hours as needed for Pain. Max Daily Amount: 12 mg. 60 Tab 0    cyclobenzaprine (FLEXERIL) 10 mg tablet Take 1 Tab by mouth three (3) times daily as needed for Muscle Spasm(s).  20 Tab 0     Allergies   Allergen Reactions    Adhesive Rash    Augmentin [Amoxicillin-Pot Clavulanate] Hives    Codeine Nausea and Vomiting    Crestor [Rosuvastatin] Hives    Dilaudid [Hydromorphone] Hives    Doxycycline Hives    Levaquin [Levofloxacin] Unknown (comments)    Morphine Hives    Oxycodone Itching    Penicillins Hives    Sulfa (Sulfonamide Antibiotics) Hives       Objective:  Visit Vitals  /82 (BP 1 Location: Left arm, BP Patient Position: Sitting)   Pulse 74   Temp 98.1 °F (36.7 °C) (Oral)   Ht 5' 5\" (1.651 m)   Wt 239 lb 3.2 oz (108.5 kg)   LMP 10/10/2010   SpO2 98%   BMI 39.80 kg/m²     Physical Exam:   General appearance - alert, well appearing, and in no distress  Mental status - alert, oriented to person, place, and time  EYE-RADHA, EOMI, fundi normal, corneas normal, no foreign bodies  ENT-ENT exam normal, no neck nodes or sinus tenderness  Nose - normal and patent, no erythema, discharge or polyps  Mouth - mucous membranes moist, pharynx normal without lesions  Neck - supple, no significant adenopathy   Chest - clear to auscultation, no wheezes, rales or rhonchi, symmetric air entry   Heart - normal rate, regular rhythm, normal S1, S2, no murmurs, rubs, clicks or gallops   Abdomen - soft, nontender, nondistended, no masses or organomegaly  Lymph- no adenopathy palpable  Ext-peripheral pulses normal, no pedal edema, no clubbing or cyanosis  Skin-Warm and dry. no hyperpigmentation, vitiligo, or suspicious lesions  Neuro -alert, oriented, normal speech, no focal findings or movement disorder noted  Musculoskeletal- FROM, no bony abnormalities, no point tenderness    No results found for this visit on 04/26/19. All results for lab orders may not have been returned by the time this encountered was closed. Assessment/Plan:       ICD-10-CM ICD-9-CM    1. Anxiety F41.9 300.00 clonazePAM (KLONOPIN) 0.5 mg tablet       Orders Placed This Encounter    furosemide (LASIX) 20 mg tablet     Sig: Take 1 Tab by mouth daily. Dispense:  30 Tab     Refill:  1    sertraline (ZOLOFT) 50 mg tablet     Sig: Take 1 Tab by mouth daily. Dispense:  30 Tab     Refill:  1    clonazePAM (KLONOPIN) 0.5 mg tablet     Sig: Take 1 Tab by mouth daily as needed (anxiety). Dispense:  15 Tab     Refill:  0       Follow-up and Dispositions    · Return for as scheduled. I have reviewed with the patient details of the assessment and plan and all questions were answered. Relevent patient education was performed. Verbal and/or written instructions (see AVS) provided. The most recent lab findings were reviewed with the patient.   Plan was discussed with patient who verbal expressed understanding. An After Visit Summary was printed and given to the patient.       Marzette Krabbe, MD

## 2019-04-26 NOTE — PROGRESS NOTES
Chau Kidd is a 64 y.o. female presenting for Depression  . 1. Have you been to the ER, urgent care clinic since your last visit? Hospitalized since your last visit? No    2. Have you seen or consulted any other health care providers outside of the [address] [place] [address] since your last visit? Include any pap smears or colon screening. Dr Og Godwin yesterday- back follow up. No flowsheet data found. Abuse Screening Questionnaire 4/26/2019   Do you ever feel afraid of your partner? N   Are you in a relationship with someone who physically or mentally threatens you? N   Is it safe for you to go home? Y       3 most recent PHQ Screens 4/26/2019   Little interest or pleasure in doing things Nearly every day   Feeling down, depressed, irritable, or hopeless Nearly every day   Total Score PHQ 2 6       There are no discontinued medications.

## 2019-05-13 RX ORDER — CITALOPRAM 20 MG/1
20 TABLET, FILM COATED ORAL DAILY
Qty: 30 TAB | Refills: 2 | Status: SHIPPED | OUTPATIENT
Start: 2019-05-13 | End: 2019-05-16 | Stop reason: ALTCHOICE

## 2019-05-16 ENCOUNTER — DOCUMENTATION ONLY (OUTPATIENT)
Dept: INTERNAL MEDICINE CLINIC | Age: 59
End: 2019-05-16

## 2019-05-16 RX ORDER — FLUOXETINE HYDROCHLORIDE 20 MG/1
20 CAPSULE ORAL DAILY
Qty: 30 CAP | Refills: 3 | Status: SHIPPED | OUTPATIENT
Start: 2019-05-16 | End: 2019-05-23 | Stop reason: ALTCHOICE

## 2019-05-23 ENCOUNTER — OFFICE VISIT (OUTPATIENT)
Dept: INTERNAL MEDICINE CLINIC | Age: 59
End: 2019-05-23

## 2019-05-23 VITALS
SYSTOLIC BLOOD PRESSURE: 112 MMHG | TEMPERATURE: 98.6 F | BODY MASS INDEX: 39.32 KG/M2 | RESPIRATION RATE: 16 BRPM | WEIGHT: 236 LBS | HEART RATE: 78 BPM | HEIGHT: 65 IN | OXYGEN SATURATION: 96 % | DIASTOLIC BLOOD PRESSURE: 74 MMHG

## 2019-05-23 DIAGNOSIS — J01.00 ACUTE NON-RECURRENT MAXILLARY SINUSITIS: Primary | ICD-10-CM

## 2019-05-23 RX ORDER — CEFDINIR 300 MG/1
300 CAPSULE ORAL 2 TIMES DAILY
Qty: 20 CAP | Refills: 0 | Status: SHIPPED | OUTPATIENT
Start: 2019-05-23 | End: 2019-06-02

## 2019-05-23 RX ORDER — BUPROPION HYDROCHLORIDE 150 MG/1
150 TABLET ORAL
Qty: 30 TAB | Refills: 0 | Status: SHIPPED | OUTPATIENT
Start: 2019-05-23 | End: 2020-02-27

## 2019-05-23 NOTE — PROGRESS NOTES
This note will not be viewable in [address] Cali Dumont is a 64 y.o. female and presents with Sinus Pain (x 3 weeks)  . Subjective:    Mrs. Davion Nash presents with complaint of sinus pain. Symptoms have been present for 3 weeks. She is taken multiple over-the-counter medications of no benefit. She denies fever chills or rigors. She denies cough or chest congestion. She has discolored drainage. Review of Systems  Constitutional:   Eyes:   negative for visual disturbance and irritation  ENT:   negative for tinnitus,sore throat,nasal congestion,ear pains. hoarseness  Respiratory:  negative for cough, hemoptysis, dyspnea,wheezing  CV:   negative for chest pain, palpitations, lower extremity edema  GI:   negative for nausea, vomiting, diarrhea, abdominal pain,melena  Endo:               negative for polyuria,polydipsia,polyphagia,heat intolerance  Genitourinary: negative for frequency, dysuria and hematuria  Integumentary: negative for rash and pruritus  Hematologic:  negative for easy bruising and gum/nose bleeding  Musculoskel: negative for myalgias, arthralgias, back pain, muscle weakness, joint pain  Neurological:  negative for headaches, dizziness, vertigo, memory problems and gait   Behavl/Psych: negative for feelings of anxiety, depression, mood changes    Past Medical History:   Diagnosis Date    Anxiety 10/2/2017    Arthritis 10/2/2017    Atypical chest pain 10/2/2017    Autoimmune disease ([facility] [address]     Jitendra Nader    Back pain 10/2/2017    Chronic pain     LOWERT BACK, RIGHT LEG    Diabetes ([facility] [address]     IDDM    Diabetes mellitus ([facility] [address] 10/2/2017    Environmental allergies     COLOGNES, CLEANING PRODUCTS, PETROLEUM - BREATHING PROBLEMS    Fatigue 10/2/2017    GERD (gastroesophageal reflux disease)     Hypothyroidism 10/2/2017    Iron deficiency anemia 10/2/2017    Irritable bowel syndrome with constipation 10/2/2017    Migraine     LAST HEADACHE 10-3-13    Nausea & vomiting     Non-allergic rhinitis     Thyroid disease     HYPOTHYROID    TMJ arthralgia 10/2/2017    Ulnar neuropathy at elbow, right 10/2/2017     Past Surgical History:   Procedure Laterality Date    HX APPENDECTOMY  CHILD    HX  SECTION      HX GI  2012    COLONOSCOPY    HX GYN      TUBAL PREGNANCY    HX GYN      EXC BARTHOLIN CYST    HX HEENT      SINUS SURGERY    HX ORTHOPAEDIC      EXC CYST RIGHT WRIST    HX ORTHOPAEDIC      REPAIR FX RIGHT LOWER LEG - TESSA    HX TONSILLECTOMY  CHILD    HX TUBAL LIGATION       Social History     Socioeconomic History    Marital status:      Spouse name: Not on file    Number of children: Not on file    Years of education: Not on file    Highest education level: Not on file   Tobacco Use    Smoking status: Former Smoker     Last attempt to quit: 10/10/1990     Years since quittin.6    Smokeless tobacco: Never Used   Substance and Sexual Activity    Alcohol use: Yes     Comment: 4 DRINKS PER MONTH    Drug use: No     Family History   Problem Relation Age of Onset    Post-op Nausea/Vomiting Mother     Asthma Mother     COPD Mother     Heart Attack Mother     Diabetes Father     Heart Disease Father     Cancer Sister         BREAST    Arthritis-osteo Sister     Arthritis-rheumatoid Sister     No Known Problems Daughter      Current Outpatient Medications   Medication Sig Dispense Refill    cefdinir (OMNICEF) 300 mg capsule Take 1 Cap by mouth two (2) times a day for 10 days. 20 Cap 0    buPROPion XL (WELLBUTRIN XL) 150 mg tablet Take 1 Tab by mouth every morning. 30 Tab 0    furosemide (LASIX) 20 mg tablet Take 1 Tab by mouth daily. 30 Tab 1    traMADol (ULTRAM) 50 mg tablet Take 50 mg by mouth every four to six (4-6) hours as needed for Pain.  insulin lispro (HUMALOG) 100 unit/mL injection by SubCUTAneous route.  20-25 Units before breakfast, lunch and dinner      INSULIN DEGLUDEC (TRESIBA FLEXTOUCH U-200 SC) 120 Units by SubCUTAneous route nightly.  dulaglutide (TRULICITY) 1.5 NS/6.0 mL sub-q pen 1.5 mg by SubCUTAneous route every seven (7) days. TAKES EVERY FRIDAY      levothyroxine (SYNTHROID) 175 mcg tablet Take 200 mcg by mouth Daily (before breakfast).  lisinopril (PRINIVIL, ZESTRIL) 10 mg tablet Take 10 mg by mouth every evening.  clonazePAM (KLONOPIN) 0.5 mg tablet Take 1 Tab by mouth daily as needed (anxiety). 15 Tab 0    naloxone (NARCAN) 4 mg/actuation nasal spray Use 1 spray intranasally, then discard. Repeat with new spray every 2 min as needed for opioid overdose symptoms, alternating nostrils. 2 Each 0    cyclobenzaprine (FLEXERIL) 10 mg tablet Take 1 Tab by mouth three (3) times daily as needed for Muscle Spasm(s). 20 Tab 0    LORazepam (ATIVAN) 1 mg tablet Take  by mouth every four (4) hours as needed for Anxiety.        Allergies   Allergen Reactions    Adhesive Rash    Augmentin [Amoxicillin-Pot Clavulanate] Hives    Codeine Nausea and Vomiting    Crestor [Rosuvastatin] Hives    Dilaudid [Hydromorphone] Hives    Doxycycline Hives    Levaquin [Levofloxacin] Unknown (comments)    Morphine Hives    Oxycodone Itching    Penicillins Hives    Sulfa (Sulfonamide Antibiotics) Hives    Zoloft [Sertraline] Hives       Objective:  Visit Vitals  /74 (BP 1 Location: Left arm, BP Patient Position: Sitting)   Pulse 78   Temp 98.6 °F (37 °C) (Oral)   Resp 16   Ht 5' 5\" (1.651 m)   Wt 236 lb (107 kg)   LMP 10/10/2010   SpO2 96%   BMI 39.27 kg/m²     Physical Exam:   General appearance - alert, well appearing, and in no distress  Mental status - alert, oriented to person, place, and time  EYE-RADHA, EOMI, fundi normal, corneas normal, no foreign bodies  ENT-frontal sinuses maxillary sinuses are tender to percussion and show decreased translation light bilaterally  Nose -erythematous and boggy bilaterally  Mouth - mucous membranes moist, pharynx normal without lesions  Neck - supple, no significant adenopathy   Chest - clear to auscultation, no wheezes, rales or rhonchi, symmetric air entry   Heart - normal rate, regular rhythm, normal S1, S2, no murmurs, rubs, clicks or gallops   Abdomen - soft, nontender, nondistended, no masses or organomegaly  Lymph- no adenopathy palpable  Ext-peripheral pulses normal, no pedal edema, no clubbing or cyanosis  Skin-Warm and dry. no hyperpigmentation, vitiligo, or suspicious lesions  Neuro -alert, oriented, normal speech, no focal findings or movement disorder noted  Musculoskeletal- FROM, no bony abnormalities, no point tenderness    No results found for this visit on 05/23/19. All results for lab orders may not have been returned by the time this encountered was closed. Assessment/Plan:       ICD-10-CM ICD-9-CM    1. Acute non-recurrent maxillary sinusitis J01.00 461.0        Orders Placed This Encounter    cefdinir (OMNICEF) 300 mg capsule     Sig: Take 1 Cap by mouth two (2) times a day for 10 days. Dispense:  20 Cap     Refill:  0    buPROPion XL (WELLBUTRIN XL) 150 mg tablet     Sig: Take 1 Tab by mouth every morning. Dispense:  30 Tab     Refill:  0     Plan:    Omnicef 300 mg 1 tablet twice daily for 10 days. Patient has taken this medication previously without difficulty despite her history of allergy to penicillins. She will resume Wellbutrin  mg daily for 1 month. We will increase her dose to 300 mg daily thereafter she will discontinue Prozac which have been prescribed in lieu of citalopram because she is on tramadol for pain. There is a theoretical risk of serotonin syndrome and QT prolongation with this combination of medicines. Follow-up and Dispositions    · Return for as scheduled. I have reviewed with the patient details of the assessment and plan and all questions were answered. Relevent patient education was performed.  Verbal and/or written instructions (see AVS) provided. The most recent lab findings were reviewed with the patient. Plan was discussed with patient who verbal expressed understanding. An After Visit Summary was printed and given to the patient.       Katja Brown MD

## 2019-05-23 NOTE — PROGRESS NOTES
Sheryl Parham presents today at the clinic for     Chief Complaint   Patient presents with   [place] Sinus Pain     x 3 weeks        Wt Readings from Last 3 Encounters:   05/23/19 236 lb (107 kg)   04/26/19 239 lb 3.2 oz (108.5 kg)   03/13/19 238 lb (108 kg)     Temp Readings from Last 3 Encounters:   05/23/19 98.6 °F (37 °C) (Oral)   04/26/19 98.1 °F (36.7 °C) (Oral)   03/13/19 98.5 °F (36.9 °C) (Oral)     BP Readings from Last 3 Encounters:   05/23/19 112/74   04/26/19 122/82   03/13/19 112/69     Pulse Readings from Last 3 Encounters:   05/23/19 78   04/26/19 74   03/13/19 93       Health Maintenance Due   Topic    Hepatitis C Screening     LIPID PANEL Q1     Pneumococcal 0-64 years (1 of 1 - PPSV23)    FOOT EXAM Q1     MICROALBUMIN Q1     DTaP/Tdap/Td series (1 - Tdap)    PAP AKA CERVICAL CYTOLOGY     Shingrix Vaccine Age 50> (1 of 2)    BREAST CANCER SCRN MAMMOGRAM     FOBT Q 1 YEAR AGE 50-75     HEMOGLOBIN A1C Q6M          Learning Assessment:  :     No flowsheet data found. Depression Screening:  :     3 most recent PHQ Screens 4/26/2019   Little interest or pleasure in doing things Nearly every day   Feeling down, depressed, irritable, or hopeless Nearly every day   Total Score PHQ 2 6       Fall Risk Assessment:  :     No flowsheet data found. Abuse Screening:  :     Abuse Screening Questionnaire 4/26/2019   Do you ever feel afraid of your partner? N   Are you in a relationship with someone who physically or mentally threatens you? N   Is it safe for you to go home?  Shoshana Marin

## 2019-05-23 NOTE — LETTER
NOTIFICATION RETURN TO WORK / SCHOOL 
 
5/23/2019 10:17 AM 
 
Ms. [address] [facility] [address] To Whom It May Concern: 
 
Jennifer Jameson is currently under the care of [address]. She will return to work/school on: Thursday May 23, 2019 If there are questions or concerns please have the patient contact our office. Sincerely, Brittney Castellano MD

## 2019-06-03 RX ORDER — AZITHROMYCIN 250 MG/1
TABLET, FILM COATED ORAL
Qty: 6 TAB | Refills: 0 | Status: SHIPPED | OUTPATIENT
Start: 2019-06-03 | End: 2019-06-08

## 2019-06-19 RX ORDER — FUROSEMIDE 20 MG/1
TABLET ORAL
Qty: 30 TAB | Refills: 6 | Status: SHIPPED | OUTPATIENT
Start: 2019-06-19 | End: 2020-01-21

## 2020-01-21 RX ORDER — FUROSEMIDE 20 MG/1
TABLET ORAL
Qty: 30 TAB | Refills: 5 | Status: SHIPPED | OUTPATIENT
Start: 2020-01-21 | End: 2020-02-27

## 2020-02-27 ENCOUNTER — OFFICE VISIT (OUTPATIENT)
Dept: URGENT CARE | Age: 60
End: 2020-02-27

## 2020-02-27 VITALS
RESPIRATION RATE: 18 BRPM | WEIGHT: 241 LBS | SYSTOLIC BLOOD PRESSURE: 133 MMHG | OXYGEN SATURATION: 98 % | HEART RATE: 82 BPM | BODY MASS INDEX: 40.15 KG/M2 | TEMPERATURE: 98.5 F | HEIGHT: 65 IN | DIASTOLIC BLOOD PRESSURE: 80 MMHG

## 2020-02-27 DIAGNOSIS — J32.9 BACTERIAL SINUSITIS: Primary | ICD-10-CM

## 2020-02-27 DIAGNOSIS — B96.89 BACTERIAL SINUSITIS: Primary | ICD-10-CM

## 2020-02-27 RX ORDER — PREDNISONE 10 MG/1
TABLET ORAL
Qty: 21 TAB | Refills: 0 | Status: SHIPPED | OUTPATIENT
Start: 2020-02-27 | End: 2021-05-03

## 2020-02-27 RX ORDER — FUROSEMIDE 20 MG/1
20 TABLET ORAL DAILY
COMMUNITY
End: 2020-08-13

## 2020-02-27 RX ORDER — CEFDINIR 300 MG/1
300 CAPSULE ORAL 2 TIMES DAILY
Qty: 20 CAP | Refills: 0 | Status: SHIPPED | OUTPATIENT
Start: 2020-02-27 | End: 2020-03-08

## 2020-02-27 NOTE — PATIENT INSTRUCTIONS
Sinusitis: Care Instructions Your Care Instructions Sinusitis is an infection of the lining of the sinus cavities in your head. Sinusitis often follows a cold. It causes pain and pressure in your head and face. In most cases, sinusitis gets better on its own in 1 to 2 weeks. But some mild symptoms may last for several weeks. Sometimes antibiotics are needed. Follow-up care is a key part of your treatment and safety. Be sure to make and go to all appointments, and call your doctor if you are having problems. It's also a good idea to know your test results and keep a list of the medicines you take. How can you care for yourself at home? · Take an over-the-counter pain medicine, such as acetaminophen (Tylenol), ibuprofen (Advil, Motrin), or naproxen (Aleve). Read and follow all instructions on the label. · If the doctor prescribed antibiotics, take them as directed. Do not stop taking them just because you feel better. You need to take the full course of antibiotics. · Be careful when taking over-the-counter cold or flu medicines and Tylenol at the same time. Many of these medicines have acetaminophen, which is Tylenol. Read the labels to make sure that you are not taking more than the recommended dose. Too much acetaminophen (Tylenol) can be harmful. · Breathe warm, moist air from a steamy shower, a hot bath, or a sink filled with hot water. Avoid cold, dry air. Using a humidifier in your home may help. Follow the directions for cleaning the machine. · Use saline (saltwater) nasal washes to help keep your nasal passages open and wash out mucus and bacteria. You can buy saline nose drops at a grocery store or drugstore. Or you can make your own at home by adding 1 teaspoon of salt and 1 teaspoon of baking soda to 2 cups of distilled water. If you make your own, fill a bulb syringe with the solution, insert the tip into your nostril, and squeeze gently. Bobo Bees your nose. · Put a hot, wet towel or a warm gel pack on your face 3 or 4 times a day for 5 to 10 minutes each time. · Try a decongestant nasal spray like oxymetazoline (Afrin). Do not use it for more than 3 days in a row. Using it for more than 3 days can make your congestion worse. When should you call for help? Call your doctor now or seek immediate medical care if: 
  · You have new or worse swelling or redness in your face or around your eyes.  
  · You have a new or higher fever.  
 Watch closely for changes in your health, and be sure to contact your doctor if: 
  · You have new or worse facial pain.  
  · The mucus from your nose becomes thicker (like pus) or has new blood in it.  
  · You are not getting better as expected. Where can you learn more? Go to http://sang-negar.info/. Enter U618 in the search box to learn more about \"Sinusitis: Care Instructions. \" Current as of: October 21, 2018 Content Version: 12.2 © 8251-9882 [employer]. Care instructions adapted under license by [employer] (which disclaims liability or warranty for this information). If you have questions about a medical condition or this instruction, always ask your healthcare professional. [address] any warranty or liability for your use of this information.

## 2020-02-27 NOTE — PROGRESS NOTES
The history is provided by the patient. Sinus Pain   This is a chronic problem. The current episode started more than 1 week ago. The problem occurs constantly. The problem has not changed since onset. Associated symptoms include headaches. Pertinent negatives include no chest pain and no shortness of breath. Nothing aggravates the symptoms. Nothing relieves the symptoms. She has tried nothing for the symptoms. Patient states she have had symptoms for 3 weeks and not is causing headaches. Patient states she have chronic sinusitis and currently is not being followed by ENT. Denies chest pain, SOB or dizziness.   Past Medical History:   Diagnosis Date    Anxiety 10/2/2017    Arthritis 10/2/2017    Atypical chest pain 10/2/2017    Autoimmune disease ([facility] [address]     FIBROMYLAGIA    Back pain 10/2/2017    Chronic pain     LOWERT BACK, RIGHT LEG    Diabetes ([facility] [address]     IDDM    Diabetes mellitus ([facility] [address] 10/2/2017    Environmental allergies     COLOGNES, CLEANING PRODUCTS, PETROLEUM - BREATHING PROBLEMS    Fatigue 10/2/2017    GERD (gastroesophageal reflux disease)     Hypothyroidism 10/2/2017    Iron deficiency anemia 10/2/2017    Irritable bowel syndrome with constipation 10/2/2017    Migraine     LAST HEADACHE 10-3-13    Nausea & vomiting     Non-allergic rhinitis     Thyroid disease     HYPOTHYROID    TMJ arthralgia 10/2/2017    Ulnar neuropathy at elbow, right 10/2/2017        Past Surgical History:   Procedure Laterality Date    HX APPENDECTOMY  CHILD    HX  SECTION      HX GI      COLONOSCOPY    HX GYN      TUBAL PREGNANCY    HX GYN      EXC BARTHOLIN CYST    HX HEENT      SINUS SURGERY    HX ORTHOPAEDIC      EXC CYST RIGHT WRIST    HX ORTHOPAEDIC      REPAIR FX RIGHT LOWER LEG - TESSA    HX TONSILLECTOMY  CHILD    HX TUBAL LIGATION           Family History   Problem Relation Age of Onset    Post-op Nausea/Vomiting Mother     Asthma Mother     COPD Mother     Heart Attack Mother     Diabetes Father     Heart Disease Father     Cancer Sister         BREAST    Arthritis-osteo Sister    [facility] Arthritis-rheumatoid Sister     No Known Problems Daughter         Social History     Socioeconomic History    Marital status:      Spouse name: Not on file    Number of children: Not on file    Years of education: Not on file    Highest education level: Not on file   Occupational History    Not on file   Social Needs    Financial resource strain: Not on file    Food insecurity:     Worry: Not on file     Inability: Not on file    Transportation needs:     Medical: Not on file     Non-medical: Not on file   Tobacco Use    Smoking status: Former Smoker     Last attempt to quit: 10/10/1990     Years since quittin.4    Smokeless tobacco: Never Used   Substance and Sexual Activity    Alcohol use: Yes     Comment: 4 DRINKS PER MONTH    Drug use: No    Sexual activity: Not on file   Lifestyle    Physical activity:     Days per week: Not on file     Minutes per session: Not on file    Stress: Not on file   Relationships    Social connections:     Talks on phone: Not on file     Gets together: Not on file     Attends Jew service: Not on file     Active member of club or organization: Not on file     Attends meetings of clubs or organizations: Not on file     Relationship status: Not on file    Intimate partner violence:     Fear of current or ex partner: Not on file     Emotionally abused: Not on file     Physically abused: Not on file     Forced sexual activity: Not on file   Other Topics Concern    Not on file   Social History Narrative    Not on file                ALLERGIES: Adhesive; Augmentin [amoxicillin-pot clavulanate]; Codeine; Crestor [rosuvastatin]; Cymbalta [duloxetine]; Dilaudid [hydromorphone]; Doxycycline; Levaquin [levofloxacin]; Morphine;  Oxycodone; Penicillins; Sulfa (sulfonamide antibiotics); and Zoloft [sertraline]    Review of Systems Constitutional: Negative for chills, fatigue and fever. HENT: Positive for postnasal drip, rhinorrhea, sinus pressure and sinus pain. Negative for congestion, ear discharge, ear pain, hearing loss, sore throat, trouble swallowing and voice change. Eyes: Negative. Respiratory: Negative for cough, chest tightness, shortness of breath and wheezing. Cardiovascular: Negative for chest pain. Gastrointestinal: Negative for nausea and vomiting. Genitourinary: Negative. Musculoskeletal: Negative. Skin: Negative. Neurological: Positive for headaches. Negative for dizziness, syncope, weakness and light-headedness. Vitals:    02/27/20 1542   BP: 133/80   Pulse: 82   Resp: 18   Temp: 98.5 °F (36.9 °C)   SpO2: 98%   Weight: 241 lb (109.3 kg)   Height: 5' 5\" (1.651 m)       Physical Exam  Constitutional:       Appearance: Normal appearance. She is well-developed. HENT:      Right Ear: Tympanic membrane normal.      Left Ear: Tympanic membrane normal.      Nose: Congestion and rhinorrhea present. Comments: Ethmoid and maxillary pain upon palpation     Mouth/Throat:      Pharynx: No oropharyngeal exudate or posterior oropharyngeal erythema. Eyes:      Conjunctiva/sclera: Conjunctivae normal.      Pupils: Pupils are equal, round, and reactive to light. Neck:      Musculoskeletal: Normal range of motion. Cardiovascular:      Rate and Rhythm: Normal rate and regular rhythm. Heart sounds: Normal heart sounds. Pulmonary:      Effort: Pulmonary effort is normal.      Breath sounds: Normal breath sounds. Musculoskeletal: Normal range of motion. Lymphadenopathy:      Cervical: No cervical adenopathy. Skin:     General: Skin is warm and dry. Neurological:      Mental Status: She is alert and oriented to person, place, and time.    Psychiatric:         Mood and Affect: Mood normal.         MDM     Differential Diagnosis; Clinical Impression; Plan:     (J32.9,  B96.89) Bacterial sinusitis  (primary encounter diagnosis)  Orders Placed This Encounter      cefdinir (OMNICEF) 300 mg capsule          Sig: Take 1 Cap by mouth two (2) times a day for 10 days. Dispense:  20 Cap          Refill:  0      predniSONE (STERAPRED DS) 10 mg dose pack          Sig: See administration instruction per 10mg dose pack          Dispense:  21 Tab          Refill:  0    Advised to increase water intake. Use tylenol or ibuprofen for discomfort. Use a daily antihistamine. Warm compress to sinus area daily. Advised to follow up with ENT if symptoms worsen or does not resolve. The patients condition was discussed with the patient and they understand. The patient is to follow up with PCP. If signs and symptoms become worse the pt is to go to the ER. The patient is to take medications as prescribed. AVS given with patient instructions upon discharge.                   Procedures

## 2021-02-15 RX ORDER — FUROSEMIDE 20 MG/1
TABLET ORAL
Qty: 90 TAB | Refills: 1 | Status: SHIPPED | OUTPATIENT
Start: 2021-02-15 | End: 2021-05-03

## 2021-03-29 RX ORDER — SUMATRIPTAN 100 MG/1
TABLET, FILM COATED ORAL
Qty: 9 TAB | Refills: 1 | Status: SHIPPED | OUTPATIENT
Start: 2021-03-29

## 2021-05-03 ENCOUNTER — OFFICE VISIT (OUTPATIENT)
Dept: URGENT CARE | Age: 61
End: 2021-05-03
Payer: COMMERCIAL

## 2021-05-03 VITALS — TEMPERATURE: 98.8 F | HEART RATE: 81 BPM | OXYGEN SATURATION: 98 % | RESPIRATION RATE: 17 BRPM

## 2021-05-03 DIAGNOSIS — J30.89 ENVIRONMENTAL AND SEASONAL ALLERGIES: ICD-10-CM

## 2021-05-03 DIAGNOSIS — R51.9 NONINTRACTABLE HEADACHE, UNSPECIFIED CHRONICITY PATTERN, UNSPECIFIED HEADACHE TYPE: ICD-10-CM

## 2021-05-03 DIAGNOSIS — J01.10 ACUTE NON-RECURRENT FRONTAL SINUSITIS: Primary | ICD-10-CM

## 2021-05-03 PROCEDURE — 99213 OFFICE O/P EST LOW 20 MIN: CPT | Performed by: NURSE PRACTITIONER

## 2021-05-03 RX ORDER — INSULIN ASPART 100 [IU]/ML
INJECTION, SOLUTION INTRAVENOUS; SUBCUTANEOUS
COMMUNITY

## 2021-05-03 RX ORDER — DOXYCYCLINE 100 MG/1
100 CAPSULE ORAL 2 TIMES DAILY
Qty: 20 CAP | Refills: 0 | Status: SHIPPED | OUTPATIENT
Start: 2021-05-03 | End: 2021-05-13

## 2021-05-03 NOTE — PROGRESS NOTES
This patient was seen at [address] [place] [address] Urgent Care while in their vehicle due to COVID-19 pandemic with PPE and focused examination in order to decrease community viral transmission. The patient/guardian gave verbal consent to treat. The history is provided by the patient. Sinus Pain  This is a new problem. Episode onset: 3-4 weeks. The problem has been gradually worsening. Associated symptoms include headaches. Pertinent negatives include no chest pain, no abdominal pain and no shortness of breath. Nothing aggravates the symptoms. Nothing relieves the symptoms. Treatments tried: mucinex, antihistamine, nasal steroid spray, decongestant. The treatment provided no relief. History of seasonal allergies.     Past Medical History:   Diagnosis Date    Anxiety 10/2/2017    Arthritis 10/2/2017    Atypical chest pain 10/2/2017    Autoimmune disease ([facility] [address]     FIBROMYLAGIA    Back pain 10/2/2017    Chronic pain     LOWERT BACK, RIGHT LEG    Diabetes ([facility] [address]     IDDM    Diabetes mellitus ([facility] [address] 10/2/2017    Environmental allergies     COLOGNES, CLEANING PRODUCTS, PETROLEUM - BREATHING PROBLEMS    Fatigue 10/2/2017    GERD (gastroesophageal reflux disease)     Hypothyroidism 10/2/2017    Iron deficiency anemia 10/2/2017    Irritable bowel syndrome with constipation 10/2/2017    Migraine     LAST HEADACHE 10-3-13    Nausea & vomiting     Non-allergic rhinitis     Thyroid disease     HYPOTHYROID    TMJ arthralgia 10/2/2017    Ulnar neuropathy at elbow, right 10/2/2017        Past Surgical History:   Procedure Laterality Date    HX APPENDECTOMY  CHILD    HX  SECTION      HX GI      COLONOSCOPY    HX GYN      TUBAL PREGNANCY    HX GYN      EXC BARTHOLIN CYST    HX HEENT      SINUS SURGERY    HX ORTHOPAEDIC      EXC CYST RIGHT WRIST    HX ORTHOPAEDIC      REPAIR FX RIGHT LOWER LEG - TESSA    HX TONSILLECTOMY  CHILD    HX TUBAL LIGATION           Family History   Problem Relation Age of Onset    Post-op Nausea/Vomiting Mother     Asthma Mother     COPD Mother     Heart Attack Mother     Diabetes Father     Heart Disease Father     Cancer Sister         BREAST    Arthritis-osteo Sister    [employer] Arthritis-rheumatoid Sister     No Known Problems Daughter         Social History     Socioeconomic History    Marital status:      Spouse name: Not on file    Number of children: Not on file    Years of education: Not on file    Highest education level: Not on file   Occupational History    Not on file   Social Needs    Financial resource strain: Not on file    Food insecurity     Worry: Not on file     Inability: Not on file    Transportation needs     Medical: Not on file     Non-medical: Not on file   Tobacco Use    Smoking status: Former Smoker     Quit date: 10/10/1990     Years since quittin.5    Smokeless tobacco: Never Used   Substance and Sexual Activity    Alcohol use: Yes     Comment: 4 DRINKS PER MONTH    Drug use: No    Sexual activity: Not on file   Lifestyle    Physical activity     Days per week: Not on file     Minutes per session: Not on file    Stress: Not on file   Relationships    Social connections     Talks on phone: Not on file     Gets together: Not on file     Attends Mu-ism service: Not on file     Active member of club or organization: Not on file     Attends meetings of clubs or organizations: Not on file     Relationship status: Not on file    Intimate partner violence     Fear of current or ex partner: Not on file     Emotionally abused: Not on file     Physically abused: Not on file     Forced sexual activity: Not on file   Other Topics Concern    Not on file   Social History Narrative    Not on file                ALLERGIES: Adhesive, Augmentin [amoxicillin-pot clavulanate], Codeine, Crestor [rosuvastatin], Cymbalta [duloxetine], Dilaudid [hydromorphone], Doxycycline, Levaquin [levofloxacin], Morphine, Oxycodone, Penicillins, Sulfa (sulfonamide antibiotics), and Zoloft [sertraline]    Review of Systems   Constitutional: Positive for fatigue. Negative for activity change, appetite change, chills, diaphoresis and fever. HENT: Positive for congestion, postnasal drip, rhinorrhea, sinus pressure and sinus pain. Negative for ear pain, hearing loss and sore throat. Respiratory: Negative for cough, chest tightness, shortness of breath and wheezing. Cardiovascular: Negative for chest pain. Gastrointestinal: Negative for abdominal pain, constipation, diarrhea, nausea and vomiting. Musculoskeletal: Positive for myalgias. Skin: Negative for rash. Neurological: Positive for headaches. Negative for dizziness, weakness and light-headedness. Vitals:    05/03/21 1521   Pulse: 81   Resp: 17   Temp: 98.8 °F (37.1 °C)   SpO2: 98%       Physical Exam  Vitals signs and nursing note reviewed. Constitutional:       General: She is not in acute distress. Appearance: Normal appearance. She is not ill-appearing. HENT:      Head: Normocephalic and atraumatic. Right Ear: Tympanic membrane and ear canal normal.      Left Ear: Tympanic membrane and ear canal normal.      Nose:      Right Turbinates: Swollen. Left Turbinates: Swollen. Right Sinus: Maxillary sinus tenderness and frontal sinus tenderness present. Left Sinus: Maxillary sinus tenderness and frontal sinus tenderness present. Mouth/Throat:      Lips: Pink. Mouth: Mucous membranes are moist.      Pharynx: Oropharynx is clear. No pharyngeal swelling or posterior oropharyngeal erythema. Tonsils: No tonsillar exudate. Eyes:      Conjunctiva/sclera: Conjunctivae normal.      Pupils: Pupils are equal, round, and reactive to light. Neck:      Musculoskeletal: Normal range of motion and neck supple. No muscular tenderness. Cardiovascular:      Rate and Rhythm: Normal rate and regular rhythm. Heart sounds: Normal heart sounds.  No murmur. Pulmonary:      Effort: Pulmonary effort is normal.      Breath sounds: Normal breath sounds. No wheezing or rhonchi. Musculoskeletal: Normal range of motion. Lymphadenopathy:      Cervical: No cervical adenopathy. Skin:     General: Skin is warm and dry. Findings: No rash. Neurological:      Mental Status: She is alert and oriented to person, place, and time. Psychiatric:         Mood and Affect: Mood normal.         Behavior: Behavior normal.         MDM    Procedures      ICD-10-CM ICD-9-CM   1. Acute non-recurrent frontal sinusitis  J01.10 461.1   2. Nonintractable headache, unspecified chronicity pattern, unspecified headache type  R51.9 784.0   3. Environmental and seasonal allergies  J30.89 477.8       Orders Placed This Encounter    doxycycline (VIBRAMYCIN) 100 mg capsule     Sig: Take 1 Cap by mouth two (2) times a day for 10 days. Dispense:  20 Cap     Refill:  0      Discussed presentation with patient and treatment recommendations. Advised on viral vs bacterial illness and following treatment plan developed. Will start on doxycycline and complete as directed due to longevity of symptoms. Listed as allergy but discussed with patient who reports she has  tolerated in the past without recalled issue. Defer steroid use due to history of DM and labile glucose currently. Advised on mech of action and potential side effects of medications. Encouraged to push fluids, tylenol as needed for pain, warm salt water gargles, OTC mucinex etc.   Continue daily antihistamine with decongestant as already initiated. Pt declined COVID testing due to lack of exposure, length of symptoms and consistency with history of sinusitis historically. The patient is to follow up with PCP PRN. If signs and symptoms become worse the pt is to go to the ER.      Signed By: Renell Brittle, NP     May 3, 2021

## 2021-05-27 ENCOUNTER — OFFICE VISIT (OUTPATIENT)
Dept: URGENT CARE | Age: 61
End: 2021-05-27
Payer: COMMERCIAL

## 2021-05-27 VITALS — OXYGEN SATURATION: 97 % | RESPIRATION RATE: 16 BRPM | TEMPERATURE: 98 F | HEART RATE: 75 BPM

## 2021-05-27 DIAGNOSIS — J01.40 ACUTE NON-RECURRENT PANSINUSITIS: Primary | ICD-10-CM

## 2021-05-27 PROCEDURE — 99213 OFFICE O/P EST LOW 20 MIN: CPT | Performed by: FAMILY MEDICINE

## 2021-05-27 RX ORDER — AZITHROMYCIN 250 MG/1
TABLET, FILM COATED ORAL
Qty: 6 TABLET | Refills: 0 | Status: SHIPPED | OUTPATIENT
Start: 2021-05-27

## 2021-05-27 NOTE — PROGRESS NOTES
This patient was seen at [address] [place] [address] Urgent Care while in their vehicle due to COVID-19 pandemic with PPE and focused examination in order to decrease community viral transmission. The patient/guardian gave verbal consent to treat. Alejandra Garcia is a 63 y.o. female who presents with sinus pain/pressure x 5 weeks along with slight cough. Was placed on doxycycline 3 weeks ago but had to stop half way through due to gastroenteritis. Denies fever, SOB. The history is provided by the patient.         Past Medical History:   Diagnosis Date    Anxiety 10/2/2017    Arthritis 10/2/2017    Atypical chest pain 10/2/2017    Autoimmune disease ([facility] [address]     FIBROMYLAGIA    Back pain 10/2/2017    Chronic pain     LOWERT BACK, RIGHT LEG    Diabetes ([facility] [address]     IDDM    Diabetes mellitus ([facility] [address] 10/2/2017    Environmental allergies     COLOGNES, CLEANING PRODUCTS, PETROLEUM - BREATHING PROBLEMS    Fatigue 10/2/2017    GERD (gastroesophageal reflux disease)     Hypothyroidism 10/2/2017    Iron deficiency anemia 10/2/2017    Irritable bowel syndrome with constipation 10/2/2017    Migraine     LAST HEADACHE 10-3-13    Nausea & vomiting     Non-allergic rhinitis     Thyroid disease     HYPOTHYROID    TMJ arthralgia 10/2/2017    Ulnar neuropathy at elbow, right 10/2/2017        Past Surgical History:   Procedure Laterality Date    HX APPENDECTOMY  CHILD    HX  SECTION      HX GI      COLONOSCOPY    HX GYN      TUBAL PREGNANCY    HX GYN      EXC BARTHOLIN CYST    HX HEENT      SINUS SURGERY    HX ORTHOPAEDIC      EXC CYST RIGHT WRIST    HX ORTHOPAEDIC      REPAIR FX RIGHT LOWER LEG - TESSA    HX TONSILLECTOMY  CHILD    HX TUBAL LIGATION           Family History   Problem Relation Age of Onset    Post-op Nausea/Vomiting Mother     Asthma Mother     COPD Mother     Heart Attack Mother     Diabetes Father     Heart Disease Father     Cancer Sister         BREAST    Arthritis-osteo Sister    Ivanna Blake Arthritis-rheumatoid Sister     No Known Problems Daughter         Social History     Socioeconomic History    Marital status:      Spouse name: Not on file    Number of children: Not on file    Years of education: Not on file    Highest education level: Not on file   Occupational History    Not on file   Tobacco Use    Smoking status: Former Smoker     Quit date: 10/10/1990     Years since quittin.6    Smokeless tobacco: Never Used   Substance and Sexual Activity    Alcohol use: Yes     Comment: 4 DRINKS PER MONTH    Drug use: No    Sexual activity: Not on file   Other Topics Concern    Not on file   Social History Narrative    Not on file     Social Determinants of Health     Financial Resource Strain:     Difficulty of Paying Living Expenses:    Food Insecurity:     Worried About [address] in the Last Year:     [address] in the Last Year:    Transportation Needs:     Lack of Transportation (Medical):  Lack of Transportation (Non-Medical):    Physical Activity:     Days of Exercise per Week:     Minutes of Exercise per Session:    Stress:     Feeling of Stress :    Social Connections:     Frequency of Communication with Friends and Family:     Frequency of Social Gatherings with Friends and Family:     Attends Sikh Services:     Active Member of Clubs or Organizations:     Attends Club or Organization Meetings:     Marital Status:    Intimate Partner Violence:     Fear of Current or Ex-Partner:     Emotionally Abused:     Physically Abused:     Sexually Abused:                  ALLERGIES: Adhesive, Augmentin [amoxicillin-pot clavulanate], Codeine, Crestor [rosuvastatin], Cymbalta [duloxetine], Dilaudid [hydromorphone], Doxycycline, Levaquin [levofloxacin], Morphine, Oxycodone, Penicillins, Sulfa (sulfonamide antibiotics), and Zoloft [sertraline]    Review of Systems   Constitutional: Negative for activity change, appetite change and fever.   HENT: Positive for congestion, sinus pressure and sinus pain. Respiratory: Positive for cough. Negative for shortness of breath. Vitals:    05/27/21 1912   Pulse: 75   Resp: 16   Temp: 98 °F (36.7 °C)   SpO2: 97%       Physical Exam  Vitals and nursing note reviewed. Constitutional:       General: She is not in acute distress. Appearance: She is well-developed. She is not diaphoretic. HENT:      Nose: Congestion present. Right Sinus: Maxillary sinus tenderness and frontal sinus tenderness present. Left Sinus: Maxillary sinus tenderness and frontal sinus tenderness present. Pulmonary:      Effort: Pulmonary effort is normal. No respiratory distress. Breath sounds: Normal breath sounds. No stridor. No wheezing, rhonchi or rales. Neurological:      Mental Status: She is alert. Psychiatric:         Behavior: Behavior normal.         Thought Content: Thought content normal.         Judgment: Judgment normal.         [facility]    ICD-10-CM ICD-9-CM   1. Acute non-recurrent pansinusitis  J01.40 461.8       Orders Placed This Encounter    azithromycin (ZITHROMAX) 250 mg tablet     Sig: Take two tablets today then one tablet daily     Dispense:  6 Tablet     Refill:  0      OTC antihistamine daily  The patient is to follow up with PCP [facility]. If signs and symptoms become worse the pt is to go to the ER.          Procedures

## 2022-03-18 PROBLEM — E03.9 HYPOTHYROIDISM: Status: ACTIVE | Noted: 2017-10-02

## 2022-03-18 PROBLEM — R07.89 ATYPICAL CHEST PAIN: Status: ACTIVE | Noted: 2017-10-02

## 2022-03-18 PROBLEM — R53.83 FATIGUE: Status: ACTIVE | Noted: 2017-10-02

## 2022-03-19 PROBLEM — M26.629 TMJ ARTHRALGIA: Status: ACTIVE | Noted: 2017-10-02

## 2022-03-19 PROBLEM — D50.9 IRON DEFICIENCY ANEMIA: Status: ACTIVE | Noted: 2017-10-02

## 2022-03-19 PROBLEM — K58.1 IRRITABLE BOWEL SYNDROME WITH CONSTIPATION: Status: ACTIVE | Noted: 2017-10-02

## 2022-03-19 PROBLEM — F41.9 ANXIETY: Status: ACTIVE | Noted: 2017-10-02

## 2022-03-19 PROBLEM — M19.90 ARTHRITIS: Status: ACTIVE | Noted: 2017-10-02

## 2022-03-19 PROBLEM — E11.9 DIABETES MELLITUS (HCC): Status: ACTIVE | Noted: 2017-10-02

## 2022-03-19 PROBLEM — M54.9 BACK PAIN: Status: ACTIVE | Noted: 2017-10-02

## 2022-03-19 PROBLEM — G56.21 ULNAR NEUROPATHY AT ELBOW, RIGHT: Status: ACTIVE | Noted: 2017-10-02

## 2022-03-19 PROBLEM — M48.061 SPINAL STENOSIS, LUMBAR: Status: ACTIVE | Noted: 2019-01-09

## 2022-03-19 PROBLEM — E66.01 SEVERE OBESITY (HCC): Status: ACTIVE | Noted: 2018-12-17

## 2023-08-07 ENCOUNTER — OFFICE VISIT (OUTPATIENT)
Facility: CLINIC | Age: 63
End: 2023-08-07
Payer: COMMERCIAL

## 2023-08-07 VITALS
TEMPERATURE: 98.3 F | DIASTOLIC BLOOD PRESSURE: 70 MMHG | HEIGHT: 65 IN | OXYGEN SATURATION: 100 % | RESPIRATION RATE: 20 BRPM | HEART RATE: 69 BPM | SYSTOLIC BLOOD PRESSURE: 118 MMHG | BODY MASS INDEX: 41.99 KG/M2 | WEIGHT: 252 LBS

## 2023-08-07 DIAGNOSIS — I73.9 CLAUDICATION (HCC): ICD-10-CM

## 2023-08-07 DIAGNOSIS — E11.9 TYPE 2 DIABETES MELLITUS WITHOUT COMPLICATION, WITH LONG-TERM CURRENT USE OF INSULIN (HCC): ICD-10-CM

## 2023-08-07 DIAGNOSIS — Z00.00 ANNUAL PHYSICAL EXAM: Primary | ICD-10-CM

## 2023-08-07 DIAGNOSIS — E03.9 ACQUIRED HYPOTHYROIDISM: ICD-10-CM

## 2023-08-07 DIAGNOSIS — Z79.4 TYPE 2 DIABETES MELLITUS WITHOUT COMPLICATION, WITH LONG-TERM CURRENT USE OF INSULIN (HCC): ICD-10-CM

## 2023-08-07 PROCEDURE — 99396 PREV VISIT EST AGE 40-64: CPT | Performed by: INTERNAL MEDICINE

## 2023-08-07 RX ORDER — LEVOTHYROXINE SODIUM 0.03 MG/1
TABLET ORAL
COMMUNITY
Start: 2023-06-26

## 2023-08-07 RX ORDER — TIRZEPATIDE 2.5 MG/.5ML
INJECTION, SOLUTION SUBCUTANEOUS
COMMUNITY
Start: 2023-08-03

## 2023-08-07 ASSESSMENT — PATIENT HEALTH QUESTIONNAIRE - PHQ9
2. FEELING DOWN, DEPRESSED OR HOPELESS: 0
SUM OF ALL RESPONSES TO PHQ QUESTIONS 1-9: 0
SUM OF ALL RESPONSES TO PHQ QUESTIONS 1-9: 0
1. LITTLE INTEREST OR PLEASURE IN DOING THINGS: 0
SUM OF ALL RESPONSES TO PHQ QUESTIONS 1-9: 0
SUM OF ALL RESPONSES TO PHQ9 QUESTIONS 1 & 2: 0
SUM OF ALL RESPONSES TO PHQ QUESTIONS 1-9: 0

## 2023-08-07 NOTE — PROGRESS NOTES
Sheron Phan is a 66 y.o. female and presents with Annual Exam  .    Subjective:    Mrs. Alyssa Slade presents today for complete physical exam.  Her history is significant for diabetes for which she is being followed by endocrinology. Most recent A1c was over 11%. She was started on Mounjaro. She notes that her insulin has been increased gradually over the years. She has gained a significant mount of weight with this. She is now having increased discomfort and skin discoloration of her lower extremities. This has been present now for the past couple of months. She does have a history of back issues and has had 2 back surgeries with discomfort in her legs or claudication with walking. This seems to improve when she stops to rest and sometimes when stretching. She also has a history of hypothyroidism. Review of Systems  Constitutional: negative for fevers, chills, anorexia and weight loss  Eyes:   negative for visual disturbance and irritation  ENT:   negative for tinnitus,sore throat,nasal congestion,ear pains. hoarseness  Respiratory:  negative for cough, hemoptysis, dyspnea,wheezing  CV:   negative for chest pain, palpitations, lower extremity edema  GI:   negative for nausea, vomiting, diarrhea, abdominal pain,melena  Endo:               negative for polyuria,polydipsia,polyphagia,heat intolerance  Genitourinary: negative for frequency, dysuria and hematuria  Integumentary: negative for rash and pruritus  Hematologic:  negative for easy bruising and gum/nose bleeding  Musculoskel: negative for myalgias, back pain, muscle weakness, joint pain  Neurological:  negative for headaches, dizziness, vertigo, memory problems and gait   Behavl/Psych: negative for feelings of anxiety, depression, mood changes    Past Medical History:   Diagnosis Date    Anxiety 10/2/2017    Arthritis 10/2/2017    Atypical chest pain 10/2/2017    Autoimmune disease ([address])     FIBROMYLAGIA    Back pain 10/2/2017    Chronic pain

## 2023-08-07 NOTE — PROGRESS NOTES
Salley Ganser is a 66 y.o. female presenting for Annual Exam  .     1. Have you been to the ER, urgent care clinic since your last visit? Hospitalized since your last visit? No    2. Have you seen or consulted any other health care providers outside of the [address] [place] [place] [address] since your last visit? Include any pap smears or colon screening.   Endocrinologist

## 2023-08-09 ENCOUNTER — CLINICAL DOCUMENTATION (OUTPATIENT)
Facility: CLINIC | Age: 63
End: 2023-08-09

## 2023-08-09 NOTE — PROGRESS NOTES
Patient called stating it would cost her out of packet $600.00 to go to [address] [place] [address] to have the vascular duplex. Patient checked with her insurance and they advise her to go to S that it would be cheaper. Requisition & o/v dated 8/7/23 faxed to [phone] & [phone] Confirmation received.

## 2024-05-23 ENCOUNTER — OFFICE VISIT (OUTPATIENT)
Facility: CLINIC | Age: 64
End: 2024-05-23
Payer: COMMERCIAL

## 2024-05-23 VITALS
TEMPERATURE: 97.9 F | SYSTOLIC BLOOD PRESSURE: 122 MMHG | BODY MASS INDEX: 41.65 KG/M2 | HEART RATE: 78 BPM | HEIGHT: 65 IN | WEIGHT: 250 LBS | OXYGEN SATURATION: 97 % | RESPIRATION RATE: 20 BRPM | DIASTOLIC BLOOD PRESSURE: 72 MMHG

## 2024-05-23 DIAGNOSIS — E66.01 OBESITY, CLASS III, BMI 40-49.9 (MORBID OBESITY) (HCC): ICD-10-CM

## 2024-05-23 DIAGNOSIS — Z79.4 TYPE 2 DIABETES MELLITUS WITHOUT COMPLICATION, WITH LONG-TERM CURRENT USE OF INSULIN (HCC): ICD-10-CM

## 2024-05-23 DIAGNOSIS — R00.2 PALPITATIONS: Primary | ICD-10-CM

## 2024-05-23 DIAGNOSIS — E11.9 TYPE 2 DIABETES MELLITUS WITHOUT COMPLICATION, WITH LONG-TERM CURRENT USE OF INSULIN (HCC): ICD-10-CM

## 2024-05-23 DIAGNOSIS — E03.9 ACQUIRED HYPOTHYROIDISM: ICD-10-CM

## 2024-05-23 PROCEDURE — 93000 ELECTROCARDIOGRAM COMPLETE: CPT | Performed by: INTERNAL MEDICINE

## 2024-05-23 PROCEDURE — 99213 OFFICE O/P EST LOW 20 MIN: CPT | Performed by: INTERNAL MEDICINE

## 2024-05-23 SDOH — ECONOMIC STABILITY: FOOD INSECURITY: WITHIN THE PAST 12 MONTHS, YOU WORRIED THAT YOUR FOOD WOULD RUN OUT BEFORE YOU GOT MONEY TO BUY MORE.: NEVER TRUE

## 2024-05-23 SDOH — ECONOMIC STABILITY: HOUSING INSECURITY
IN THE LAST 12 MONTHS, WAS THERE A TIME WHEN YOU DID NOT HAVE A STEADY PLACE TO SLEEP OR SLEPT IN A SHELTER (INCLUDING NOW)?: NO

## 2024-05-23 SDOH — ECONOMIC STABILITY: FOOD INSECURITY: WITHIN THE PAST 12 MONTHS, THE FOOD YOU BOUGHT JUST DIDN'T LAST AND YOU DIDN'T HAVE MONEY TO GET MORE.: NEVER TRUE

## 2024-05-23 SDOH — ECONOMIC STABILITY: INCOME INSECURITY: HOW HARD IS IT FOR YOU TO PAY FOR THE VERY BASICS LIKE FOOD, HOUSING, MEDICAL CARE, AND HEATING?: NOT HARD AT ALL

## 2024-05-23 ASSESSMENT — PATIENT HEALTH QUESTIONNAIRE - PHQ9
2. FEELING DOWN, DEPRESSED OR HOPELESS: NOT AT ALL
SUM OF ALL RESPONSES TO PHQ9 QUESTIONS 1 & 2: 0
SUM OF ALL RESPONSES TO PHQ QUESTIONS 1-9: 0
SUM OF ALL RESPONSES TO PHQ QUESTIONS 1-9: 0
1. LITTLE INTEREST OR PLEASURE IN DOING THINGS: NOT AT ALL
SUM OF ALL RESPONSES TO PHQ QUESTIONS 1-9: 0
SUM OF ALL RESPONSES TO PHQ QUESTIONS 1-9: 0

## 2024-05-23 NOTE — PROGRESS NOTES
Marta Davis is a 64 y.o. female and presents with Other (Heart Palpatation)  .    Subjective:    Mrs. Davis presents today with complaint of palpitations.  She had an episode last Friday that lasted couple of hours.  She was outside putting some plants.  The weather was about 60 degrees and she was not doing anything strenuous.  She felt palpitations in her chest and was a little lightheaded so she sat down.  She felt a little short of breath.  This initially improved but then reoccurred and she had to sit down again.  Overall this lasted for couple of hours.  Since she has noted intermittent flutters in her chest but no associated chest pain or shortness of breath.  She has no PND orthopnea or pedal edema.  Her history is significant for diabetes.  She had labs done recently with her endocrinologist.  She is on thyroid replacement.  Past Medical History:   Diagnosis Date    Anxiety 10/2/2017    Arthritis 10/2/2017    Atypical chest pain 10/2/2017    Autoimmune disease (HCC)     FIBROMYLAGIA    Back pain 10/2/2017    Chronic pain     LOWERT BACK, RIGHT LEG    Diabetes (HCC)     IDDM    Diabetes mellitus ([facility]) 10/2/2017    Environmental allergies     COLOGNES, CLEANING PRODUCTS, PETROLEUM - BREATHING PROBLEMS    Fatigue 10/2/2017    GERD (gastroesophageal reflux disease)     Hypothyroidism 10/2/2017    Iron deficiency anemia 10/2/2017    Irritable bowel syndrome with constipation 10/2/2017    Migraine     LAST HEADACHE 10-3-13    Nausea & vomiting     Non-allergic rhinitis     Thyroid disease     HYPOTHYROID    TMJ arthralgia 10/2/2017    Ulnar neuropathy at elbow, right 10/2/2017     Past Surgical History:   Procedure Laterality Date    APPENDECTOMY  CHILD     SECTION      GI      COLONOSCOPY    GYN      EXC BARTHOLIN CYST    GYN      TUBAL PREGNANCY    HEENT      SINUS SURGERY    ORTHOPEDIC SURGERY      EXC CYST RIGHT WRIST    ORTHOPEDIC SURGERY      REPAIR FX RIGHT LOWER LEG - DARCY

## 2024-05-23 NOTE — PROGRESS NOTES
Marta Davis is a 64 y.o. female     Chief Complaint   Patient presents with    Other     Heart Palpatation       /72 (Site: Left Upper Arm, Position: Sitting, Cuff Size: Medium Adult)   Pulse 78   Temp 97.9 °F (36.6 °C) (Oral)   Resp 20   Ht 1.651 m (5' 5\")   Wt 113.4 kg (250 lb)   SpO2 97%   BMI 41.60 kg/m²     Health Maintenance Due   Topic Date Due    COVID-19 Vaccine (1) Never done    Pneumococcal 0-64 years Vaccine (1 of 2 - PCV) Never done    Diabetic foot exam  Never done    A1C test (Diabetic or Prediabetic)  Never done    Lipids  Never done    HIV screen  Never done    Diabetic Alb to Cr ratio (uACR) test  Never done    GFR test (Diabetes, CKD 3-4, OR last GFR 15-59)  Never done    Hepatitis C screen  Never done    DTaP/Tdap/Td vaccine (1 - Tdap) Never done    Cervical cancer screen  Never done    Colorectal Cancer Screen  Never done    Breast cancer screen  Never done    Shingles vaccine (1 of 2) Never done    Diabetic retinal exam  02/13/2021    Respiratory Syncytial Virus (RSV) Pregnant or age 60 yrs+ (1 - 1-dose 60+ series) Never done         \"Have you been to the ER, urgent care clinic since your last visit?  Hospitalized since your last visit?\"    NO    “Have you seen or consulted any other health care providers outside of [facility] since your last visit?”    Yes 3 weeks ago Endocrinologist     “Have you had a colorectal cancer screening such as a colonoscopy/FIT/Cologuard?    NO    No colonoscopy on file  No cologuard on file  No FIT/FOBT on file   No flexible sigmoidoscopy on file        Have you had a mammogram?”   NO    No breast cancer screening on file      “Have you had a pap smear?”    NO    No cervical cancer screening on file

## 2024-10-30 ENCOUNTER — OFFICE VISIT (OUTPATIENT)
Facility: CLINIC | Age: 64
End: 2024-10-30
Payer: MEDICAID

## 2024-10-30 VITALS
RESPIRATION RATE: 18 BRPM | HEIGHT: 65 IN | TEMPERATURE: 97.8 F | BODY MASS INDEX: 40.65 KG/M2 | SYSTOLIC BLOOD PRESSURE: 122 MMHG | OXYGEN SATURATION: 99 % | DIASTOLIC BLOOD PRESSURE: 70 MMHG | WEIGHT: 244 LBS | HEART RATE: 79 BPM

## 2024-10-30 DIAGNOSIS — Z79.4 TYPE 2 DIABETES MELLITUS WITHOUT COMPLICATION, WITH LONG-TERM CURRENT USE OF INSULIN (HCC): ICD-10-CM

## 2024-10-30 DIAGNOSIS — E66.01 OBESITY, CLASS III, BMI 40-49.9 (MORBID OBESITY): Primary | ICD-10-CM

## 2024-10-30 DIAGNOSIS — E03.9 ACQUIRED HYPOTHYROIDISM: ICD-10-CM

## 2024-10-30 DIAGNOSIS — E11.9 TYPE 2 DIABETES MELLITUS WITHOUT COMPLICATION, WITH LONG-TERM CURRENT USE OF INSULIN (HCC): ICD-10-CM

## 2024-10-30 PROBLEM — E66.813 OBESITY, CLASS III, BMI 40-49.9 (MORBID OBESITY): Status: ACTIVE | Noted: 2018-12-17

## 2024-10-30 PROCEDURE — 3046F HEMOGLOBIN A1C LEVEL >9.0%: CPT | Performed by: INTERNAL MEDICINE

## 2024-10-30 PROCEDURE — 99214 OFFICE O/P EST MOD 30 MIN: CPT | Performed by: INTERNAL MEDICINE

## 2024-10-30 PROCEDURE — G8417 CALC BMI ABV UP PARAM F/U: HCPCS | Performed by: INTERNAL MEDICINE

## 2024-10-30 PROCEDURE — 2022F DILAT RTA XM EVC RTNOPTHY: CPT | Performed by: INTERNAL MEDICINE

## 2024-10-30 PROCEDURE — G8427 DOCREV CUR MEDS BY ELIG CLIN: HCPCS | Performed by: INTERNAL MEDICINE

## 2024-10-30 PROCEDURE — G8484 FLU IMMUNIZE NO ADMIN: HCPCS | Performed by: INTERNAL MEDICINE

## 2024-10-30 PROCEDURE — 1036F TOBACCO NON-USER: CPT | Performed by: INTERNAL MEDICINE

## 2024-10-30 PROCEDURE — 3017F COLORECTAL CA SCREEN DOC REV: CPT | Performed by: INTERNAL MEDICINE

## 2024-10-30 NOTE — PROGRESS NOTES
Marta Davis is a 64 y.o. female     Chief Complaint   Patient presents with    3M overdue/discuss medication       /70 (Site: Left Upper Arm, Position: Sitting, Cuff Size: Large Adult)   Pulse 79   Temp 97.8 °F (36.6 °C) (Temporal)   Resp 18   Ht 1.651 m (5' 5\")   Wt 110.7 kg (244 lb)   SpO2 99%   BMI 40.60 kg/m²     Health Maintenance Due   Topic Date Due    Pneumococcal 0-64 years Vaccine (1 of 2 - PCV) Never done    Diabetic foot exam  Never done    A1C test (Diabetic or Prediabetic)  Never done    Lipids  Never done    HIV screen  Never done    Diabetic Alb to Cr ratio (uACR) test  Never done    GFR test (Diabetes, CKD 3-4, OR last GFR 15-59)  Never done    Hepatitis C screen  Never done    DTaP/Tdap/Td vaccine (1 - Tdap) Never done    Cervical cancer screen  Never done    Breast cancer screen  Never done    Colorectal Cancer Screen  Never done    Shingles vaccine (1 of 2) Never done    Diabetic retinal exam  02/13/2021    Respiratory Syncytial Virus (RSV) Pregnant or age 60 yrs+ (1 - 1-dose 60+ series) Never done    Flu vaccine (1) 08/01/2024    COVID-19 Vaccine (1 - 2023-24 season) Never done         \"Have you been to the ER, urgent care clinic since your last visit?  Hospitalized since your last visit?\"    NO    “Have you seen or consulted any other health care providers outside of [facility] since your last visit?”    NO    “Have you had a colorectal cancer screening such as a colonoscopy/FIT/Cologuard?    NO    No colonoscopy on file  No cologuard on file  No FIT/FOBT on file   No flexible sigmoidoscopy on file        Have you had a mammogram?”   NO    No breast cancer screening on file      “Have you had a pap smear?”    NO    No cervical cancer screening on file

## 2024-10-31 LAB
ALBUMIN SERPL-MCNC: 4.2 G/DL (ref 3.5–5)
ALBUMIN/GLOB SERPL: 1.4 (ref 1.1–2.2)
ALP SERPL-CCNC: 84 U/L (ref 45–117)
ALT SERPL-CCNC: 52 U/L (ref 12–78)
ANION GAP SERPL CALC-SCNC: 8 MMOL/L (ref 2–12)
APPEARANCE UR: CLEAR
AST SERPL-CCNC: 35 U/L (ref 15–37)
BACTERIA URNS QL MICRO: ABNORMAL /HPF
BILIRUB SERPL-MCNC: 0.5 MG/DL (ref 0.2–1)
BILIRUB UR QL: NEGATIVE
BUN SERPL-MCNC: 16 MG/DL (ref 6–20)
BUN/CREAT SERPL: 21 (ref 12–20)
CALCIUM SERPL-MCNC: 9.6 MG/DL (ref 8.5–10.1)
CAOX CRY URNS QL MICRO: ABNORMAL
CHLORIDE SERPL-SCNC: 102 MMOL/L (ref 97–108)
CHOLEST SERPL-MCNC: 205 MG/DL
CK SERPL-CCNC: 100 U/L (ref 26–192)
CO2 SERPL-SCNC: 27 MMOL/L (ref 21–32)
COLOR UR: ABNORMAL
CREAT SERPL-MCNC: 0.77 MG/DL (ref 0.55–1.02)
CREAT UR-MCNC: 197 MG/DL
EPITH CASTS URNS QL MICRO: ABNORMAL /LPF
EST. AVERAGE GLUCOSE BLD GHB EST-MCNC: 214 MG/DL
GLOBULIN SER CALC-MCNC: 2.9 G/DL (ref 2–4)
GLUCOSE SERPL-MCNC: 249 MG/DL (ref 65–100)
GLUCOSE UR STRIP.AUTO-MCNC: NEGATIVE MG/DL
HBA1C MFR BLD: 9.1 % (ref 4–5.6)
HDLC SERPL-MCNC: 50 MG/DL
HDLC SERPL: 4.1 (ref 0–5)
HGB UR QL STRIP: NEGATIVE
KETONES UR QL STRIP.AUTO: ABNORMAL MG/DL
LDLC SERPL CALC-MCNC: 123.8 MG/DL (ref 0–100)
LEUKOCYTE ESTERASE UR QL STRIP.AUTO: ABNORMAL
MICROALBUMIN UR-MCNC: 8.18 MG/DL
MICROALBUMIN/CREAT UR-RTO: 42 MG/G (ref 0–30)
NITRITE UR QL STRIP.AUTO: NEGATIVE
PH UR STRIP: 5.5 (ref 5–8)
POTASSIUM SERPL-SCNC: 4.5 MMOL/L (ref 3.5–5.1)
PROT SERPL-MCNC: 7.1 G/DL (ref 6.4–8.2)
PROT UR STRIP-MCNC: ABNORMAL MG/DL
RBC #/AREA URNS HPF: ABNORMAL /HPF (ref 0–5)
SODIUM SERPL-SCNC: 137 MMOL/L (ref 136–145)
SP GR UR REFRACTOMETRY: 1.02 (ref 1–1.03)
T4 FREE SERPL-MCNC: 1.9 NG/DL (ref 0.8–1.5)
TRIGL SERPL-MCNC: 156 MG/DL
TSH SERPL DL<=0.05 MIU/L-ACNC: 1.02 UIU/ML (ref 0.36–3.74)
UROBILINOGEN UR QL STRIP.AUTO: 0.2 EU/DL (ref 0.2–1)
VLDLC SERPL CALC-MCNC: 31.2 MG/DL
WBC URNS QL MICRO: ABNORMAL /HPF (ref 0–4)

## 2024-10-31 NOTE — PROGRESS NOTES
Marta Davis is a 64 y.o. female and presents with 3M overdue/discuss medication  .    Subjective:  Mrs. Davis presents today for follow-up.  She is now on Medicaid and Medicare and is no longer able to be followed by her endocrinologist whom she has followed for some time now for her diabetes.  She previously been on Trulicity which was not very effective for her.  She is now on Mounjaro and has done very well on this medication.  Her A1c has dropped significantly.  She needs further documentation as her plan will not cover this medication unless she has tried step therapy or can demonstrate that being off of Mounjaro would make her condition worse.  She is otherwise feeling well.  She reports no significant side effects with her medications.  Past Medical History:   Diagnosis Date    Anxiety 10/2/2017    Arthritis 10/2/2017    Atypical chest pain 10/2/2017    Autoimmune disease (HCC)     FIBROMYLAGIA    Back pain 10/2/2017    Chronic pain     LOWERT BACK, RIGHT LEG    Diabetes (HCC)     IDDM    Diabetes mellitus (HCC) 10/2/2017    Environmental allergies     COLOGNES, CLEANING PRODUCTS, PETROLEUM - BREATHING PROBLEMS    Fatigue 10/2/2017    GERD (gastroesophageal reflux disease)     Hypothyroidism 10/2/2017    Iron deficiency anemia 10/2/2017    Irritable bowel syndrome with constipation 10/2/2017    Migraine     LAST HEADACHE 10-3-13    Nausea & vomiting     Non-allergic rhinitis     Thyroid disease     HYPOTHYROID    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

## 2024-11-14 DIAGNOSIS — E11.9 TYPE 2 DIABETES MELLITUS WITHOUT COMPLICATION, WITH LONG-TERM CURRENT USE OF INSULIN (HCC): Primary | ICD-10-CM

## 2024-11-14 DIAGNOSIS — Z79.4 TYPE 2 DIABETES MELLITUS WITHOUT COMPLICATION, WITH LONG-TERM CURRENT USE OF INSULIN (HCC): Primary | ICD-10-CM

## 2024-11-14 RX ORDER — TIRZEPATIDE 10 MG/.5ML
10 INJECTION, SOLUTION SUBCUTANEOUS
Qty: 2 ML | Refills: 1 | Status: SHIPPED | OUTPATIENT
Start: 2024-11-14

## 2025-01-14 DIAGNOSIS — Z79.4 TYPE 2 DIABETES MELLITUS WITHOUT COMPLICATION, WITH LONG-TERM CURRENT USE OF INSULIN (HCC): ICD-10-CM

## 2025-01-14 DIAGNOSIS — E11.9 TYPE 2 DIABETES MELLITUS WITHOUT COMPLICATION, WITH LONG-TERM CURRENT USE OF INSULIN (HCC): ICD-10-CM

## 2025-01-14 RX ORDER — TIRZEPATIDE 10 MG/.5ML
INJECTION, SOLUTION SUBCUTANEOUS
Qty: 2 ML | Refills: 1 | Status: SHIPPED | OUTPATIENT
Start: 2025-01-14

## 2025-01-14 NOTE — TELEPHONE ENCOUNTER
RX refill request from the patient/pharmacy. Patient last seen 10- with labs, and next appt. scheduled for 01-  Requested Prescriptions     Pending Prescriptions Disp Refills    MOUNJARO 10 MG/0.5ML SOAJ [Pharmacy Med Name: MOUNJARO 10 MG/0.5 ML PEN] 2 mL 1     Sig: INJECT 10 MG UNDER THE SKIN ONCE WEEKLY    .

## 2025-01-31 ENCOUNTER — OFFICE VISIT (OUTPATIENT)
Facility: CLINIC | Age: 65
End: 2025-01-31
Payer: MEDICAID

## 2025-01-31 VITALS
RESPIRATION RATE: 18 BRPM | WEIGHT: 242.2 LBS | HEART RATE: 86 BPM | SYSTOLIC BLOOD PRESSURE: 132 MMHG | BODY MASS INDEX: 40.35 KG/M2 | HEIGHT: 65 IN | TEMPERATURE: 98 F | DIASTOLIC BLOOD PRESSURE: 72 MMHG | OXYGEN SATURATION: 98 %

## 2025-01-31 DIAGNOSIS — E66.01 OBESITY, CLASS III, BMI 40-49.9 (MORBID OBESITY): ICD-10-CM

## 2025-01-31 DIAGNOSIS — Z79.4 TYPE 2 DIABETES MELLITUS WITHOUT COMPLICATION, WITH LONG-TERM CURRENT USE OF INSULIN (HCC): Primary | ICD-10-CM

## 2025-01-31 DIAGNOSIS — I10 PRIMARY HYPERTENSION: ICD-10-CM

## 2025-01-31 DIAGNOSIS — E11.9 TYPE 2 DIABETES MELLITUS WITHOUT COMPLICATION, WITH LONG-TERM CURRENT USE OF INSULIN (HCC): Primary | ICD-10-CM

## 2025-01-31 DIAGNOSIS — E03.9 ACQUIRED HYPOTHYROIDISM: ICD-10-CM

## 2025-01-31 PROCEDURE — 99214 OFFICE O/P EST MOD 30 MIN: CPT | Performed by: INTERNAL MEDICINE

## 2025-01-31 PROCEDURE — 3078F DIAST BP <80 MM HG: CPT | Performed by: INTERNAL MEDICINE

## 2025-01-31 PROCEDURE — 3075F SYST BP GE 130 - 139MM HG: CPT | Performed by: INTERNAL MEDICINE

## 2025-01-31 RX ORDER — LISINOPRIL 10 MG/1
10 TABLET ORAL EVERY EVENING
Qty: 90 TABLET | Refills: 3 | Status: SHIPPED | OUTPATIENT
Start: 2025-01-31

## 2025-01-31 RX ORDER — LEVOTHYROXINE SODIUM 200 UG/1
200 TABLET ORAL
Qty: 90 TABLET | Refills: 2 | Status: SHIPPED | OUTPATIENT
Start: 2025-01-31

## 2025-01-31 SDOH — ECONOMIC STABILITY: FOOD INSECURITY: WITHIN THE PAST 12 MONTHS, THE FOOD YOU BOUGHT JUST DIDN'T LAST AND YOU DIDN'T HAVE MONEY TO GET MORE.: NEVER TRUE

## 2025-01-31 SDOH — ECONOMIC STABILITY: FOOD INSECURITY: WITHIN THE PAST 12 MONTHS, YOU WORRIED THAT YOUR FOOD WOULD RUN OUT BEFORE YOU GOT MONEY TO BUY MORE.: NEVER TRUE

## 2025-01-31 ASSESSMENT — PATIENT HEALTH QUESTIONNAIRE - PHQ9
SUM OF ALL RESPONSES TO PHQ QUESTIONS 1-9: 0
SUM OF ALL RESPONSES TO PHQ9 QUESTIONS 1 & 2: 0
2. FEELING DOWN, DEPRESSED OR HOPELESS: NOT AT ALL
1. LITTLE INTEREST OR PLEASURE IN DOING THINGS: NOT AT ALL
SUM OF ALL RESPONSES TO PHQ QUESTIONS 1-9: 0

## 2025-01-31 NOTE — PROGRESS NOTES
Marta Davis is a 64 y.o. female     Chief Complaint   Patient presents with    3 Month Follow-Up       /72 (Site: Left Upper Arm, Position: Sitting, Cuff Size: Large Adult)   Pulse 86   Temp 98 °F (36.7 °C) (Temporal)   Resp 18   Ht 1.651 m (5' 5\")   Wt 109.9 kg (242 lb 3.2 oz)   SpO2 98%   BMI 40.30 kg/m²     Health Maintenance Due   Topic Date Due    Pneumococcal 0-64 years Vaccine (1 of 2 - PCV) Never done    Diabetic foot exam  Never done    HIV screen  Never done    Hepatitis C screen  Never done    DTaP/Tdap/Td vaccine (1 - Tdap) Never done    Cervical cancer screen  Never done    Breast cancer screen  Never done    Colorectal Cancer Screen  Never done    Shingles vaccine (1 of 2) Never done    Diabetic retinal exam  02/13/2021    Respiratory Syncytial Virus (RSV) Pregnant or age 60 yrs+ (1 - Risk 60-74 years 1-dose series) Never done    Flu vaccine (1) 08/01/2024    COVID-19 Vaccine (1 - 2023-24 season) Never done    A1C test (Diabetic or Prediabetic)  01/31/2025         \"Have you been to the ER, urgent care clinic since your last visit?  Hospitalized since your last visit?\"    NO    “Have you seen or consulted any other health care providers outside of [facility] since your last visit?”    NO    “Have you had a colorectal cancer screening such as a colonoscopy/FIT/Cologuard?    NO    No colonoscopy on file  No cologuard on file  No FIT/FOBT on file   No flexible sigmoidoscopy on file        Have you had a mammogram?”   NO    No breast cancer screening on file      “Have you had a pap smear?”    NO    No cervical cancer screening on file

## 2025-02-03 ENCOUNTER — TELEPHONE (OUTPATIENT)
Facility: CLINIC | Age: 65
End: 2025-02-03

## 2025-02-04 ENCOUNTER — TELEPHONE (OUTPATIENT)
Facility: CLINIC | Age: 65
End: 2025-02-04

## 2025-02-05 NOTE — TELEPHONE ENCOUNTER
Patient calling back to see if a different medication will be call into her pharmacy since the Toujeo was denied.  Please advise.

## 2025-02-06 NOTE — TELEPHONE ENCOUNTER
Patient called back stating she spoke with her insurance and they advised her once the prior authorization was done that the Toujeo would be approved.  Nurse can call phone #[phone].  Please advise.

## 2025-02-07 ENCOUNTER — TELEPHONE (OUTPATIENT)
Facility: CLINIC | Age: 65
End: 2025-02-07

## 2025-02-07 NOTE — TELEPHONE ENCOUNTER
Patients insurance [facility] is calling and states that patient needs a Peer to Peer to potentially have medication approved.

## 2025-02-17 DIAGNOSIS — Z79.4 TYPE 2 DIABETES MELLITUS WITHOUT COMPLICATION, WITH LONG-TERM CURRENT USE OF INSULIN (HCC): Primary | ICD-10-CM

## 2025-02-17 DIAGNOSIS — E11.9 TYPE 2 DIABETES MELLITUS WITHOUT COMPLICATION, WITH LONG-TERM CURRENT USE OF INSULIN (HCC): Primary | ICD-10-CM

## 2025-02-17 RX ORDER — INSULIN GLARGINE 100 [IU]/ML
25 INJECTION, SOLUTION SUBCUTANEOUS NIGHTLY
Qty: 5 ADJUSTABLE DOSE PRE-FILLED PEN SYRINGE | Refills: 3 | Status: SHIPPED | OUTPATIENT
Start: 2025-02-17

## 2025-02-25 NOTE — PROGRESS NOTES
Marta Davis is a 64 y.o. female and presents with 3 Month Follow-Up  .    Subjective:    Mrs. Davis presents today for 3-month follow-up for several problems including hypothyroidism, diabetes mellitus, and obesity.  She remains on lisinopril 10 mg daily for hypertension and microalbuminuria.  Her diabetes has been controlled on hemoglobin are twice daily before meals adjusted per sliding scale and insulin glargine 25 units nightly.  She has been on a GLP-1 agonist with excellent results with weight loss per endocrinology.  She has no shortness of breath, chest pain, palpitations, PND, orthopnea, or pedal edema.  Past Medical History:   Diagnosis Date    Anxiety 10/2/2017    Arthritis 10/2/2017    Atypical chest pain 10/2/2017    Autoimmune disease     FIBROMYLAGIA    Back pain 10/2/2017    Chronic pain     LOWERT BACK, RIGHT LEG    Diabetes (HCC)     IDDM    Diabetes mellitus (HCC) 10/2/2017    Environmental allergies     COLOGNES, CLEANING PRODUCTS, PETROLEUM - BREATHING PROBLEMS    Fatigue 10/2/2017    GERD (gastroesophageal reflux disease)     Hypothyroidism 10/2/2017    Iron deficiency anemia 10/2/2017    Irritable bowel syndrome with constipation 10/2/2017    Migraine     LAST HEADACHE 10-3-13    Nausea & vomiting     Non-allergic rhinitis     Thyroid disease     HYPOTHYROID    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

## 2025-03-07 ENCOUNTER — OFFICE VISIT (OUTPATIENT)
Facility: CLINIC | Age: 65
End: 2025-03-07
Payer: MEDICAID

## 2025-03-07 VITALS
DIASTOLIC BLOOD PRESSURE: 69 MMHG | TEMPERATURE: 98 F | OXYGEN SATURATION: 97 % | HEART RATE: 78 BPM | RESPIRATION RATE: 19 BRPM | BODY MASS INDEX: 39.99 KG/M2 | HEIGHT: 65 IN | WEIGHT: 240 LBS | SYSTOLIC BLOOD PRESSURE: 116 MMHG

## 2025-03-07 DIAGNOSIS — I10 PRIMARY HYPERTENSION: ICD-10-CM

## 2025-03-07 DIAGNOSIS — Z79.4 TYPE 2 DIABETES MELLITUS WITHOUT COMPLICATION, WITH LONG-TERM CURRENT USE OF INSULIN (HCC): Primary | ICD-10-CM

## 2025-03-07 DIAGNOSIS — E55.9 VITAMIN D DEFICIENCY: ICD-10-CM

## 2025-03-07 DIAGNOSIS — E11.9 TYPE 2 DIABETES MELLITUS WITHOUT COMPLICATION, WITH LONG-TERM CURRENT USE OF INSULIN (HCC): Primary | ICD-10-CM

## 2025-03-07 DIAGNOSIS — E03.9 ACQUIRED HYPOTHYROIDISM: ICD-10-CM

## 2025-03-07 LAB
COMMENT:: NORMAL
SPECIMEN HOLD: NORMAL

## 2025-03-07 PROCEDURE — 3074F SYST BP LT 130 MM HG: CPT | Performed by: INTERNAL MEDICINE

## 2025-03-07 PROCEDURE — 3078F DIAST BP <80 MM HG: CPT | Performed by: INTERNAL MEDICINE

## 2025-03-07 PROCEDURE — 99214 OFFICE O/P EST MOD 30 MIN: CPT | Performed by: INTERNAL MEDICINE

## 2025-03-07 NOTE — PROGRESS NOTES
Marta Davis is a 64 y.o. female     Chief Complaint   Patient presents with    1 Month Follow-Up       /69 (Site: Right Upper Arm, Position: Sitting, Cuff Size: Large Adult)   Pulse 78   Temp 98 °F (36.7 °C) (Temporal)   Resp 19   Ht 1.651 m (5' 5\")   Wt 108.9 kg (240 lb)   SpO2 97%   BMI 39.94 kg/m²     Health Maintenance Due   Topic Date Due    Diabetic foot exam  Never done    HIV screen  Never done    Hepatitis C screen  Never done    DTaP/Tdap/Td vaccine (1 - Tdap) Never done    Pneumococcal 50+ years Vaccine (1 of 2 - PCV) Never done    Cervical cancer screen  Never done    Breast cancer screen  Never done    Colorectal Cancer Screen  Never done    Shingles vaccine (1 of 2) Never done    Diabetic retinal exam  02/13/2021    Respiratory Syncytial Virus (RSV) Pregnant or age 60 yrs+ (1 - Risk 60-74 years 1-dose series) Never done    Flu vaccine (1) 08/01/2024    COVID-19 Vaccine (1 - 2024-25 season) Never done    A1C test (Diabetic or Prediabetic)  01/31/2025         \"Have you been to the ER, urgent care clinic since your last visit?  Hospitalized since your last visit?\"    NO    “Have you seen or consulted any other health care providers outside of [facility] since your last visit?”    NO    “Have you had a colorectal cancer screening such as a colonoscopy/FIT/Cologuard?    NO    No colonoscopy on file  No cologuard on file  No FIT/FOBT on file   No flexible sigmoidoscopy on file        Have you had a mammogram?”   NO    No breast cancer screening on file      “Have you had a pap smear?”    NO    No cervical cancer screening on file                 
Sign     Worried About Running Out of Food in the Last Year: Never true     Ran Out of Food in the Last Year: Never true   Transportation Needs: No Transportation Needs (1/31/2025)    PRAPARE - Transportation     Lack of Transportation (Medical): No     Lack of Transportation (Non-Medical): No   Housing Stability: Low Risk  (1/31/2025)    Housing Stability Vital Sign     Unable to Pay for Housing in the Last Year: No     Number of Times Moved in the Last Year: 0     Homeless in the Last Year: No     Family History   Problem Relation Age of Onset    Osteoarthritis Sister     Rheum Arthritis Sister     No Known Problems Daughter     Post-op Nausea/Vomiting Mother     Asthma Mother     COPD Mother     Heart Attack Mother     Diabetes Father     Heart Disease Father     Cancer Sister         BREAST       Review of Systems  Constitutional:  negative for fevers, chills, anorexia and weight loss  Eyes:    negative for visual disturbance and irritation  ENT:    negative for tinnitus,sore throat,nasal congestion,ear pains.hoarseness  Respiratory:     negative for cough, hemoptysis, dyspnea,wheezing  CV:    negative for chest pain, palpitations, lower extremity edema  GI:    negative for nausea, vomiting, diarrhea, abdominal pain,melena  Endo:               negative for polyuria,polydipsia,polyphagia,heat intolerance  Genitourinary : negative for frequency, dysuria and hematuria  Integumentary: negative for rash and pruritus  Hematologic:   negative for easy bruising and gum/nose bleeding  Musculoskel:  negative for myalgias, arthralgias, back pain, muscle weakness  Neurological:   negative for headaches, dizziness, vertigo, memory problems and gait   Behavl/Psych:  negative for feelings of anxiety, depression, mood changes  ROS otherwise negative      Objective:  /69 (Site: Right Upper Arm, Position: Sitting, Cuff Size: Large Adult)   Pulse 78   Temp 98 °F (36.7 °C) (Temporal)   Resp 19   Ht 1.651 m (5' 5\")   Wt

## 2025-03-08 LAB
25(OH)D3 SERPL-MCNC: 73.3 NG/ML (ref 30–100)
ALBUMIN SERPL-MCNC: 4.3 G/DL (ref 3.5–5)
ALBUMIN/GLOB SERPL: 1.2 (ref 1.1–2.2)
ALP SERPL-CCNC: 116 U/L (ref 45–117)
ALT SERPL-CCNC: 50 U/L (ref 12–78)
ANION GAP SERPL CALC-SCNC: 8 MMOL/L (ref 2–12)
APPEARANCE UR: ABNORMAL
AST SERPL-CCNC: 39 U/L (ref 15–37)
BILIRUB SERPL-MCNC: 0.4 MG/DL (ref 0.2–1)
BILIRUB UR QL: NEGATIVE
BUN SERPL-MCNC: 17 MG/DL (ref 6–20)
BUN/CREAT SERPL: 24 (ref 12–20)
CALCIUM SERPL-MCNC: 9.8 MG/DL (ref 8.5–10.1)
CHLORIDE SERPL-SCNC: 102 MMOL/L (ref 97–108)
CHOLEST SERPL-MCNC: 216 MG/DL
CO2 SERPL-SCNC: 27 MMOL/L (ref 21–32)
COLOR UR: ABNORMAL
CREAT SERPL-MCNC: 0.72 MG/DL (ref 0.55–1.02)
CREAT UR-MCNC: 231 MG/DL
EST. AVERAGE GLUCOSE BLD GHB EST-MCNC: 220 MG/DL
GLOBULIN SER CALC-MCNC: 3.5 G/DL (ref 2–4)
GLUCOSE SERPL-MCNC: 209 MG/DL (ref 65–100)
GLUCOSE UR STRIP.AUTO-MCNC: 100 MG/DL
HBA1C MFR BLD: 9.3 % (ref 4–5.6)
HDLC SERPL-MCNC: 52 MG/DL
HDLC SERPL: 4.2 (ref 0–5)
HGB UR QL STRIP: NEGATIVE
KETONES UR QL STRIP.AUTO: ABNORMAL MG/DL
LDLC SERPL CALC-MCNC: 132.6 MG/DL (ref 0–100)
LEUKOCYTE ESTERASE UR QL STRIP.AUTO: ABNORMAL
MICROALBUMIN UR-MCNC: 14.6 MG/DL
MICROALBUMIN/CREAT UR-RTO: 63 MG/G (ref 0–30)
NITRITE UR QL STRIP.AUTO: NEGATIVE
PH UR STRIP: 5.5 (ref 5–8)
POTASSIUM SERPL-SCNC: 4.1 MMOL/L (ref 3.5–5.1)
PROT SERPL-MCNC: 7.8 G/DL (ref 6.4–8.2)
PROT UR STRIP-MCNC: 30 MG/DL
SODIUM SERPL-SCNC: 137 MMOL/L (ref 136–145)
SP GR UR REFRACTOMETRY: 1.03 (ref 1–1.03)
T4 FREE SERPL-MCNC: 1.5 NG/DL (ref 0.8–1.5)
TRIGL SERPL-MCNC: 157 MG/DL
TSH SERPL DL<=0.05 MIU/L-ACNC: 1.08 UIU/ML (ref 0.36–3.74)
UROBILINOGEN UR QL STRIP.AUTO: 0.2 EU/DL (ref 0.2–1)
VLDLC SERPL CALC-MCNC: 31.4 MG/DL

## 2025-03-09 DIAGNOSIS — Z79.4 TYPE 2 DIABETES MELLITUS WITHOUT COMPLICATION, WITH LONG-TERM CURRENT USE OF INSULIN (HCC): ICD-10-CM

## 2025-03-09 DIAGNOSIS — E11.9 TYPE 2 DIABETES MELLITUS WITHOUT COMPLICATION, WITH LONG-TERM CURRENT USE OF INSULIN (HCC): ICD-10-CM

## 2025-03-10 ENCOUNTER — RESULTS FOLLOW-UP (OUTPATIENT)
Facility: CLINIC | Age: 65
End: 2025-03-10

## 2025-03-11 RX ORDER — INSULIN GLARGINE 100 [IU]/ML
50 INJECTION, SOLUTION SUBCUTANEOUS NIGHTLY
Qty: 5 ADJUSTABLE DOSE PRE-FILLED PEN SYRINGE | Refills: 3 | Status: SHIPPED | OUTPATIENT
Start: 2025-03-11

## 2025-03-11 NOTE — TELEPHONE ENCOUNTER
PCP: LEONEL Pringle MD    Last appt: 3/7/2025    No future appointments.    Requested Prescriptions     Pending Prescriptions Disp Refills    insulin glargine (LANTUS SOLOSTAR) 100 UNIT/ML injection pen 5 Adjustable Dose Pre-filled Pen Syringe 3     Sig: Inject 50 Units into the skin nightly

## 2025-03-14 DIAGNOSIS — E11.9 TYPE 2 DIABETES MELLITUS WITHOUT COMPLICATION, WITH LONG-TERM CURRENT USE OF INSULIN: ICD-10-CM

## 2025-03-14 DIAGNOSIS — Z79.4 TYPE 2 DIABETES MELLITUS WITHOUT COMPLICATION, WITH LONG-TERM CURRENT USE OF INSULIN: ICD-10-CM

## 2025-03-18 ENCOUNTER — TELEPHONE (OUTPATIENT)
Facility: CLINIC | Age: 65
End: 2025-03-18

## 2025-03-18 DIAGNOSIS — E11.9 TYPE 2 DIABETES MELLITUS WITHOUT COMPLICATION, WITH LONG-TERM CURRENT USE OF INSULIN: ICD-10-CM

## 2025-03-18 DIAGNOSIS — Z79.4 TYPE 2 DIABETES MELLITUS WITHOUT COMPLICATION, WITH LONG-TERM CURRENT USE OF INSULIN: ICD-10-CM

## 2025-03-18 RX ORDER — TIRZEPATIDE 10 MG/.5ML
10 INJECTION, SOLUTION SUBCUTANEOUS WEEKLY
Qty: 2 ML | Refills: 1 | Status: CANCELLED | OUTPATIENT
Start: 2025-03-18

## 2025-03-18 NOTE — TELEPHONE ENCOUNTER
PCP: LEONEL Pringle MD    Last appt: 3/7/2025    No future appointments.    Requested Prescriptions     Pending Prescriptions Disp Refills    MOUNJARO 10 MG/0.5ML SOAJ [Pharmacy Med Name: MOUNJARO 10 MG/0.5 ML PEN] 2 mL 1     Sig: INJECT 10 MG UNDER THE SKIN ONCE WEEKLY

## 2025-03-18 NOTE — TELEPHONE ENCOUNTER
Patient calling about her medication mounjaro.  It looks like a prescription request was sent on Friday.  Please advise.

## 2025-03-19 RX ORDER — TIRZEPATIDE 10 MG/.5ML
INJECTION, SOLUTION SUBCUTANEOUS
Qty: 2 ML | Refills: 1 | Status: SHIPPED | OUTPATIENT
Start: 2025-03-19

## 2025-05-12 DIAGNOSIS — Z79.4 TYPE 2 DIABETES MELLITUS WITHOUT COMPLICATION, WITH LONG-TERM CURRENT USE OF INSULIN (HCC): ICD-10-CM

## 2025-05-12 DIAGNOSIS — E11.9 TYPE 2 DIABETES MELLITUS WITHOUT COMPLICATION, WITH LONG-TERM CURRENT USE OF INSULIN (HCC): ICD-10-CM

## 2025-05-12 NOTE — TELEPHONE ENCOUNTER
PCP: LEONEL Pringle MD    Last appt: 3/7/2025    No future appointments.    Requested Prescriptions     Pending Prescriptions Disp Refills    MOUNJARO 10 MG/0.5ML SOAJ pen [Pharmacy Med Name: MOUNJARO 10 MG/0.5 ML PEN] 2 mL 1     Sig: INJECT 10 MG UNDER THE SKIN ONCE WEEKLY

## 2025-05-13 RX ORDER — TIRZEPATIDE 10 MG/.5ML
INJECTION, SOLUTION SUBCUTANEOUS
Qty: 2 ML | Refills: 1 | Status: SHIPPED | OUTPATIENT
Start: 2025-05-13

## 2025-06-09 ENCOUNTER — OFFICE VISIT (OUTPATIENT)
Facility: CLINIC | Age: 65
End: 2025-06-09
Payer: MEDICAID

## 2025-06-09 VITALS
TEMPERATURE: 98.6 F | HEIGHT: 65 IN | HEART RATE: 73 BPM | BODY MASS INDEX: 40.35 KG/M2 | WEIGHT: 242.2 LBS | DIASTOLIC BLOOD PRESSURE: 80 MMHG | RESPIRATION RATE: 18 BRPM | OXYGEN SATURATION: 97 % | SYSTOLIC BLOOD PRESSURE: 124 MMHG

## 2025-06-09 DIAGNOSIS — E03.9 ACQUIRED HYPOTHYROIDISM: ICD-10-CM

## 2025-06-09 DIAGNOSIS — E11.9 TYPE 2 DIABETES MELLITUS WITHOUT COMPLICATION, WITH LONG-TERM CURRENT USE OF INSULIN (HCC): Primary | ICD-10-CM

## 2025-06-09 DIAGNOSIS — Z79.4 TYPE 2 DIABETES MELLITUS WITHOUT COMPLICATION, WITH LONG-TERM CURRENT USE OF INSULIN (HCC): Primary | ICD-10-CM

## 2025-06-09 PROCEDURE — 99214 OFFICE O/P EST MOD 30 MIN: CPT | Performed by: INTERNAL MEDICINE

## 2025-06-09 PROCEDURE — 3046F HEMOGLOBIN A1C LEVEL >9.0%: CPT | Performed by: INTERNAL MEDICINE

## 2025-06-09 NOTE — PROGRESS NOTES
Marta Davis is a 65 y.o. female     Chief Complaint   Patient presents with     Diabetes check up     Pt says she discontinued the lisinopril because she's heard a lot of negative things about it. Her last dose of that was 3m ago.    /80 (BP Site: Left Upper Arm, Patient Position: Sitting, BP Cuff Size: Medium Adult)   Pulse 73   Temp 98.6 °F (37 °C) (Temporal)   Resp 18   Ht 1.651 m (5' 5\")   Wt 109.9 kg (242 lb 3.2 oz)   SpO2 97%   BMI 40.30 kg/m²     Health Maintenance Due   Topic Date Due    Diabetic foot exam  Never done    HIV screen  Never done    Hepatitis C screen  Never done    DTaP/Tdap/Td vaccine (1 - Tdap) Never done    Pneumococcal 50+ years Vaccine (1 of 2 - PCV) Never done    Cervical cancer screen  Never done    Breast cancer screen  Never done    Colorectal Cancer Screen  Never done    Shingles vaccine (1 of 2) Never done    Diabetic retinal exam  02/13/2021    Respiratory Syncytial Virus (RSV) Pregnant or age 60 yrs+ (1 - Risk 60-74 years 1-dose series) Never done    COVID-19 Vaccine (1 - 2024-25 season) Never done    A1C test (Diabetic or Prediabetic)  06/07/2025         \"Have you been to the ER, urgent care clinic since your last visit?  Hospitalized since your last visit?\"    NO    “Have you seen or consulted any other health care providers outside of [facility] since your last visit?”    NO    “Have you had a colorectal cancer screening such as a colonoscopy/FIT/Cologuard?    NO    No colonoscopy on file  No cologuard on file  No FIT/FOBT on file   No flexible sigmoidoscopy on file        Have you had a mammogram?”   NO    No breast cancer screening on file      “Have you had a pap smear?”    NO    No cervical cancer screening on file                 
Urinalysis     Urinalysis     Albumin/Creatinine Ratio, Urine     Lipid Panel     Hemoglobin A1C     Comprehensive Metabolic Panel     tirzepatide (MOUNJARO) 12.5 MG/0.5ML SOAJ injection      2. Acquired hypothyroidism  E03.9         Plan:    If blood sugars remain low will consider decreasing Lantus to 45 units every morning.  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  We will bump up her dose of Mounjaro to 12.5 mg weekly as tolerates.  She can increase fiber in her diet and fruit to see if this will help with her constipation.  She may take a nonstimulant laxative such as Senokot or MiraLAX if needed but she states this tends to cause diarrhea for her.  Further recommendations based on labs as ordered.    Follow-up and Dispositions    Return in about 3 months (around 9/9/2025) for follow up.         I have reviewed with the patient details of the assessment and plan and all questions were answered. Relevent patient education was performed. Verbal and/or written instructions (see AVS) provided. The most recent lab findings were reviewed with the patient.  Plan was discussed with patient who verbally expressed understanding.    An After Visit Summary was printed and given to the patient.    Fadi Pringle MD

## 2025-06-10 LAB
ALBUMIN SERPL-MCNC: 4.2 G/DL (ref 3.5–5)
ALBUMIN/GLOB SERPL: 1.4 (ref 1.1–2.2)
ALP SERPL-CCNC: 95 U/L (ref 45–117)
ALT SERPL-CCNC: 25 U/L (ref 12–78)
ANION GAP SERPL CALC-SCNC: 7 MMOL/L (ref 2–12)
APPEARANCE UR: CLEAR
AST SERPL-CCNC: 21 U/L (ref 15–37)
BACTERIA URNS QL MICRO: ABNORMAL /HPF
BILIRUB SERPL-MCNC: 0.2 MG/DL (ref 0.2–1)
BILIRUB UR QL: NEGATIVE
BUN SERPL-MCNC: 11 MG/DL (ref 6–20)
BUN/CREAT SERPL: 17 (ref 12–20)
CALCIUM SERPL-MCNC: 9.1 MG/DL (ref 8.5–10.1)
CHLORIDE SERPL-SCNC: 108 MMOL/L (ref 97–108)
CHOLEST SERPL-MCNC: 162 MG/DL
CO2 SERPL-SCNC: 25 MMOL/L (ref 21–32)
COLOR UR: ABNORMAL
CREAT SERPL-MCNC: 0.65 MG/DL (ref 0.55–1.02)
CREAT UR-MCNC: 171 MG/DL
EPITH CASTS URNS QL MICRO: ABNORMAL /LPF
EST. AVERAGE GLUCOSE BLD GHB EST-MCNC: 174 MG/DL
GLOBULIN SER CALC-MCNC: 3 G/DL (ref 2–4)
GLUCOSE SERPL-MCNC: 132 MG/DL (ref 65–100)
GLUCOSE UR STRIP.AUTO-MCNC: NEGATIVE MG/DL
HBA1C MFR BLD: 7.7 % (ref 4–5.6)
HDLC SERPL-MCNC: 56 MG/DL
HDLC SERPL: 2.9 (ref 0–5)
HGB UR QL STRIP: NEGATIVE
HYALINE CASTS URNS QL MICRO: ABNORMAL /LPF (ref 0–5)
KETONES UR QL STRIP.AUTO: ABNORMAL MG/DL
LDLC SERPL CALC-MCNC: 83.4 MG/DL (ref 0–100)
LEUKOCYTE ESTERASE UR QL STRIP.AUTO: ABNORMAL
MICROALBUMIN UR-MCNC: 3.42 MG/DL
MICROALBUMIN/CREAT UR-RTO: 20 MG/G (ref 0–30)
NITRITE UR QL STRIP.AUTO: NEGATIVE
PH UR STRIP: 5.5 (ref 5–8)
POTASSIUM SERPL-SCNC: 4.2 MMOL/L (ref 3.5–5.1)
PROT SERPL-MCNC: 7.2 G/DL (ref 6.4–8.2)
PROT UR STRIP-MCNC: NEGATIVE MG/DL
RBC #/AREA URNS HPF: ABNORMAL /HPF (ref 0–5)
SODIUM SERPL-SCNC: 140 MMOL/L (ref 136–145)
SP GR UR REFRACTOMETRY: 1.02 (ref 1–1.03)
TRIGL SERPL-MCNC: 113 MG/DL
UROBILINOGEN UR QL STRIP.AUTO: 0.2 EU/DL (ref 0.2–1)
VLDLC SERPL CALC-MCNC: 22.6 MG/DL
WBC URNS QL MICRO: ABNORMAL /HPF (ref 0–4)

## 2025-06-13 ENCOUNTER — RESULTS FOLLOW-UP (OUTPATIENT)
Facility: CLINIC | Age: 65
End: 2025-06-13

## 2025-06-13 DIAGNOSIS — N30.00 ACUTE CYSTITIS WITHOUT HEMATURIA: Primary | ICD-10-CM

## 2025-06-13 RX ORDER — NITROFURANTOIN 25; 75 MG/1; MG/1
100 CAPSULE ORAL 2 TIMES DAILY
Qty: 10 CAPSULE | Refills: 0 | Status: SHIPPED | OUTPATIENT
Start: 2025-06-13 | End: 2025-06-18

## 2025-06-13 NOTE — TELEPHONE ENCOUNTER
Urine analysis shows many white blood cells.  This may be consistent with urinary tract infection.  Please start Macrobid 100 mg twice daily for 7 days.    Your cholesterol profile is excellent.  There is no significant protein in the urine.  Your A1c is stable at 7.7%.  Your glucose was minimally elevated at 132.  Your liver function is normal.

## 2025-06-17 ENCOUNTER — TELEPHONE (OUTPATIENT)
Facility: CLINIC | Age: 65
End: 2025-06-17

## 2025-06-19 DIAGNOSIS — Z79.4 TYPE 2 DIABETES MELLITUS WITHOUT COMPLICATION, WITH LONG-TERM CURRENT USE OF INSULIN (HCC): Primary | ICD-10-CM

## 2025-06-19 DIAGNOSIS — E11.9 TYPE 2 DIABETES MELLITUS WITHOUT COMPLICATION, WITH LONG-TERM CURRENT USE OF INSULIN (HCC): Primary | ICD-10-CM

## 2025-06-19 RX ORDER — BLOOD-GLUCOSE METER
1 KIT MISCELLANEOUS DAILY
Qty: 1 KIT | Refills: 0 | Status: SHIPPED | OUTPATIENT
Start: 2025-06-19

## 2025-06-19 RX ORDER — GLUCOSAMINE HCL/CHONDROITIN SU 500-400 MG
CAPSULE ORAL
Qty: 400 STRIP | Refills: 3 | Status: SHIPPED | OUTPATIENT
Start: 2025-06-19

## 2025-07-01 ENCOUNTER — TELEPHONE (OUTPATIENT)
Facility: CLINIC | Age: 65
End: 2025-07-01

## 2025-08-01 DIAGNOSIS — E11.9 TYPE 2 DIABETES MELLITUS WITHOUT COMPLICATION, WITH LONG-TERM CURRENT USE OF INSULIN (HCC): ICD-10-CM

## 2025-08-01 DIAGNOSIS — Z79.4 TYPE 2 DIABETES MELLITUS WITHOUT COMPLICATION, WITH LONG-TERM CURRENT USE OF INSULIN (HCC): ICD-10-CM

## 2025-08-01 RX ORDER — INSULIN GLARGINE 100 [IU]/ML
INJECTION, SOLUTION SUBCUTANEOUS
Qty: 15 ML | Refills: 5 | Status: SHIPPED | OUTPATIENT
Start: 2025-08-01

## 2025-08-01 NOTE — TELEPHONE ENCOUNTER
PCP: LEONEL Pringle MD    Last appt: 6/9/2025    Future Appointments   Date Time Provider Department Center   9/10/2025  9:30 AM LEONEL Pringle MD [facility]       Requested Prescriptions     Pending Prescriptions Disp Refills    LANTUS SOLOSTAR 100 UNIT/ML injection pen [Pharmacy Med Name: LANTUS SOLOSTAR 100 UNIT/ML] 15 mL      Sig: INJECT 50 UNITS UNDER THE SKIN EVERY EVENING

## (undated) DEVICE — GAUZE SPONGES,12 PLY: Brand: CURITY

## (undated) DEVICE — KENDALL SCD EXPRESS SLEEVES, KNEE LENGTH, MEDIUM: Brand: KENDALL SCD

## (undated) DEVICE — SUTURE ABSRB BRAID COAT UD OS-6 NO 1 27IN VCRL J535H

## (undated) DEVICE — BLUNT DISSECTOR: Brand: ENDO PEANUT

## (undated) DEVICE — SCINTILLANT DUAL STRAIGHT TIP SURGICAL LIGHT

## (undated) DEVICE — DEVON™ KNEE AND BODY STRAP 60" X 3" (1.5 M X 7.6 CM): Brand: DEVON

## (undated) DEVICE — LAMINECTOMY RICHMOND-LF: Brand: MEDLINE INDUSTRIES, INC.

## (undated) DEVICE — STERILE POLYISOPRENE POWDER-FREE SURGICAL GLOVES WITH EMOLLIENT COATING: Brand: PROTEXIS

## (undated) DEVICE — STERILE-Z SURGICAL PATIENT COVERS CLEAR POLYETHYLENE STERILE UNIVERSAL FIT 10 PER CASE: Brand: STERILE-Z

## (undated) DEVICE — SLIM BODY SKIN STAPLER: Brand: APPOSE ULC

## (undated) DEVICE — TRAY CATH 16F URIN MTR LTX -- CONVERT TO ITEM 363111

## (undated) DEVICE — BIPOLAR FORCEPS CORD: Brand: VALLEYLAB

## (undated) DEVICE — INFECTION CONTROL KIT SYS

## (undated) DEVICE — BLADE ELECTRODE: Brand: EDGE

## (undated) DEVICE — PIN 9733236 150MM STERILE PERC REF

## (undated) DEVICE — DRAPE C-ARMOUR C-ARM KIT --

## (undated) DEVICE — ADHESIVE SKIN CLOSURE 4X22 CM PREMIERPRO EXOFINFUSION DISP

## (undated) DEVICE — DISPOSABLE BIPOLAR FORCEPS 8" (20.3CM) WORKING LENGTH HARDY BAYONET, INSULATED, 1MM TIP AND 12 FT. (3.6M) CABLE: Brand: KIRWAN

## (undated) DEVICE — PLATE 2140001 OLIF25 2-HOLE PLATE SMALL
Type: IMPLANTABLE DEVICE | Site: SPINE LUMBAR | Status: NON-FUNCTIONAL
Brand: PIVOX™ OBLIQUE LATERAL SPINAL SYSTEM
Removed: 2019-01-09

## (undated) DEVICE — COVER,TABLE,HEAVY DUTY,60"X90",STRL: Brand: MEDLINE

## (undated) DEVICE — SUTURE VCRL 2-0 L27IN ABSRB UD CP-2 L26MM 1/2 CIR REV CUT J869H

## (undated) DEVICE — 4-PORT MANIFOLD: Brand: NEPTUNE 2

## (undated) DEVICE — PREP SKN PREVAIL 40ML APPL --

## (undated) DEVICE — BONE WAX WHITE: Brand: BONE WAX WHITE

## (undated) DEVICE — SPHERE STEALTH 12PK/TY --

## (undated) DEVICE — SHEET, T, LAPAROTOMY, STERILE: Brand: MEDLINE